# Patient Record
Sex: FEMALE | Race: WHITE | Employment: FULL TIME | URBAN - METROPOLITAN AREA
[De-identification: names, ages, dates, MRNs, and addresses within clinical notes are randomized per-mention and may not be internally consistent; named-entity substitution may affect disease eponyms.]

---

## 2017-01-20 DIAGNOSIS — I10 ESSENTIAL HYPERTENSION WITH GOAL BLOOD PRESSURE LESS THAN 140/90: Primary | ICD-10-CM

## 2017-01-20 NOTE — TELEPHONE ENCOUNTER
lisinopril (PRINIVIL,ZESTRIL) 5 MG tablet      Last Written Prescription Date: 8/3/2016  Last Fill Quantity: 90, # refills: 1  Last Office Visit with [facility] [place] or  Health prescribing provider: 8/3/2016   Next 5 appointments (look out 90 days)     Feb 06, 2017 10:00 AM   PHYSICAL with Mary Carmen Rdz MD   [facility]    [address] [address]   [phone]                   POTASSIUM   Date Value Ref Range Status   07/18/2016 4.4 3.4 - 5.3 mmol/L Final     CREATININE   Date Value Ref Range Status   07/18/2016 0.91 0.52 - 1.04 mg/dL Final     BP Readings from Last 3 Encounters:   11/04/16 128/80   08/03/16 134/86   07/18/16 136/88     amLODIPine (NORVASC) 10 MG tablet      Last Written Prescription Date: 8/3/2016  Last Fill Quantity: 90, # refills: 1    Last Office Visit with [facility] [place] or  Health prescribing provider:  8/3/2016   Future Office Visit:    Next 5 appointments (look out 90 days)     Feb 06, 2017 10:00 AM   PHYSICAL with Mary Carmen Rdz MD   [facility]    [address]  [place] [address]   [phone]                    BP Readings from Last 3 Encounters:   11/04/16 128/80   08/03/16 134/86   07/18/16 136/88

## 2017-01-24 RX ORDER — AMLODIPINE BESYLATE 10 MG/1
TABLET ORAL
Qty: 90 TABLET | Refills: 1 | Status: SHIPPED | OUTPATIENT
Start: 2017-01-24 | End: 2017-05-22

## 2017-01-24 RX ORDER — LISINOPRIL 5 MG/1
TABLET ORAL
Qty: 90 TABLET | Refills: 1 | Status: SHIPPED | OUTPATIENT
Start: 2017-01-24 | End: 2017-05-22

## 2017-01-24 NOTE — TELEPHONE ENCOUNTER
Prescription approved per [facility] [place] [facility].  Sheila Gonzalez RN  ]

## 2017-03-13 ENCOUNTER — DOCUMENTATION ONLY (OUTPATIENT)
Dept: LAB | Facility: CLINIC | Age: 62
End: 2017-03-13

## 2017-03-13 DIAGNOSIS — E78.5 HYPERLIPIDEMIA LDL GOAL <130: Primary | ICD-10-CM

## 2017-03-13 DIAGNOSIS — I10 ESSENTIAL HYPERTENSION WITH GOAL BLOOD PRESSURE LESS THAN 140/90: ICD-10-CM

## 2017-03-13 DIAGNOSIS — E53.8 VITAMIN B 12 DEFICIENCY: ICD-10-CM

## 2017-03-13 DIAGNOSIS — E03.8 OTHER SPECIFIED HYPOTHYROIDISM: ICD-10-CM

## 2017-03-13 DIAGNOSIS — R31.29 MICROHEMATURIA: ICD-10-CM

## 2017-03-16 ENCOUNTER — APPOINTMENT (OUTPATIENT)
Dept: GENERAL RADIOLOGY | Facility: CLINIC | Age: 62
End: 2017-03-16
Attending: EMERGENCY MEDICINE
Payer: COMMERCIAL

## 2017-03-16 ENCOUNTER — HOSPITAL ENCOUNTER (EMERGENCY)
Facility: CLINIC | Age: 62
Discharge: HOME | End: 2017-03-16
Attending: EMERGENCY MEDICINE | Admitting: EMERGENCY MEDICINE
Payer: COMMERCIAL

## 2017-03-16 VITALS
BODY MASS INDEX: 29.97 KG/M2 | DIASTOLIC BLOOD PRESSURE: 80 MMHG | RESPIRATION RATE: 20 BRPM | WEIGHT: 200 LBS | OXYGEN SATURATION: 96 % | SYSTOLIC BLOOD PRESSURE: 120 MMHG | HEART RATE: 95 BPM | TEMPERATURE: 98.7 F

## 2017-03-16 DIAGNOSIS — F33.1 MAJOR DEPRESSIVE DISORDER, RECURRENT EPISODE, MODERATE (H): ICD-10-CM

## 2017-03-16 DIAGNOSIS — E78.5 HYPERLIPIDEMIA LDL GOAL <130: ICD-10-CM

## 2017-03-16 DIAGNOSIS — J10.1 INFLUENZA B: ICD-10-CM

## 2017-03-16 DIAGNOSIS — G43.709 CHRONIC MIGRAINE WITHOUT AURA WITHOUT STATUS MIGRAINOSUS, NOT INTRACTABLE: ICD-10-CM

## 2017-03-16 DIAGNOSIS — J18.9 CAP (COMMUNITY ACQUIRED PNEUMONIA): ICD-10-CM

## 2017-03-16 LAB
FLUAV+FLUBV AG SPEC QL: ABNORMAL
FLUAV+FLUBV AG SPEC QL: NEGATIVE
SPECIMEN SOURCE: ABNORMAL

## 2017-03-16 PROCEDURE — 99284 EMERGENCY DEPT VISIT MOD MDM: CPT | Performed by: EMERGENCY MEDICINE

## 2017-03-16 PROCEDURE — 25000125 ZZHC RX 250: Performed by: EMERGENCY MEDICINE

## 2017-03-16 PROCEDURE — 87804 INFLUENZA ASSAY W/OPTIC: CPT | Performed by: EMERGENCY MEDICINE

## 2017-03-16 PROCEDURE — 94640 AIRWAY INHALATION TREATMENT: CPT

## 2017-03-16 PROCEDURE — 99284 EMERGENCY DEPT VISIT MOD MDM: CPT | Mod: 25

## 2017-03-16 PROCEDURE — 71020 XR CHEST 2 VW: CPT

## 2017-03-16 RX ORDER — AZITHROMYCIN 250 MG/1
TABLET, FILM COATED ORAL
Qty: 6 TABLET | Refills: 0 | Status: SHIPPED | OUTPATIENT
Start: 2017-03-16 | End: 2017-03-21

## 2017-03-16 RX ORDER — ALBUTEROL SULFATE 90 UG/1
2 AEROSOL, METERED RESPIRATORY (INHALATION) EVERY 4 HOURS PRN
Qty: 1 INHALER | Refills: 0 | Status: SHIPPED | OUTPATIENT
Start: 2017-03-16 | End: 2017-03-26

## 2017-03-16 RX ORDER — OSELTAMIVIR PHOSPHATE 75 MG/1
75 CAPSULE ORAL 2 TIMES DAILY
Qty: 10 CAPSULE | Refills: 0 | Status: SHIPPED | OUTPATIENT
Start: 2017-03-16 | End: 2017-03-21

## 2017-03-16 RX ORDER — IPRATROPIUM BROMIDE AND ALBUTEROL SULFATE 2.5; .5 MG/3ML; MG/3ML
3 SOLUTION RESPIRATORY (INHALATION) ONCE
Status: COMPLETED | OUTPATIENT
Start: 2017-03-16 | End: 2017-03-16

## 2017-03-16 RX ADMIN — IPRATROPIUM BROMIDE AND ALBUTEROL SULFATE 3 ML: .5; 3 SOLUTION RESPIRATORY (INHALATION) at 14:44

## 2017-03-16 NOTE — ED PROVIDER NOTES
History     Chief Complaint   Patient presents with     Cough     HPI  Mary Grace Tompkins is a 62 year old female with a history of HTN who presents to the ED for a cough over the last 6 days. Patient states she has been coughing so much that her ribs and chest are sore. She then developed a severe left sinus headache that has radiated to her forehead. Her symptoms did improve somewhat yesterday, but after waking up from a nap yesterday afternoon they were worse again. Now she notes chills, runny nose, and sore throat. No fevers. No vomiting. Other symptoms include fatigue and a couple episodes of diarrhea. She admits she is not taking fluids well and has only urinated twice today. Patient's coworker is ill with influenza B. Patient did not receive an influenza vaccine this year.     Patient Active Problem List   Diagnosis     Obstructive sleep apnea     Migraines     Hypothyroid     OA (osteoarthritis)     GERD (gastroesophageal reflux disease)     Vitamin B 12 deficiency     Celiac disease     Diagnostic skin and sensitization tests(aka ALLERGENS)     VMR (vasomotor rhinitis)     Chronic urticaria     Advanced directives, counseling/discussion     Hot flashes     Nonspecific abnormal electrocardiogram (ECG) (EKG)     Major depressive disorder, recurrent episode, moderate (H)     Hyperlipidemia LDL goal <130     Microhematuria     Essential hypertension with goal blood pressure less than 140/90     Current Outpatient Prescriptions   Medication Sig Dispense Refill     azithromycin (ZITHROMAX Z-MINERVA) 250 MG tablet Two tablets on the first day, then one tablet daily for the next 4 days 6 tablet 0     oseltamivir (TAMIFLU) 75 MG capsule Take 1 capsule (75 mg) by mouth 2 times daily for 5 days 10 capsule 0     albuterol (PROAIR HFA/PROVENTIL HFA/VENTOLIN HFA) 108 (90 BASE) MCG/ACT Inhaler Inhale 2 puffs into the lungs every 4 hours as needed for shortness of breath / dyspnea or wheezing 1 Inhaler 0     amLODIPine (NORVASC)  10 MG tablet TAKE 1 TABLET DAILY 90 tablet 1     lisinopril (PRINIVIL/ZESTRIL) 5 MG tablet TAKE 1 TABLET DAILY 90 tablet 1     LANsoprazole (PREVACID) 30 MG CR capsule Take 1 capsule (30 mg) by mouth daily 90 capsule 3     levothyroxine (SYNTHROID/LEVOTHROID) 137 MCG tablet Take 1 tablet (137 mcg) by mouth daily ELENO. 90 tablet 2     order for DME Equipment being ordered: CPAP  AIRSENSE 10  WISP LARGE  5-15 CM H2O  SN# [id number]  DN# [id number]       topiramate (TOPAMAX) 100 MG tablet Take 1 tablet (100 mg) by mouth 2 times daily 180 tablet 1     traZODone (DESYREL) 50 MG tablet Take 1 tablet (50 mg) by mouth At Bedtime 90 tablet 3     venlafaxine (EFFEXOR-XR) 75 MG 24 hr capsule Take 3 capsules (225 mg) by mouth daily 270 capsule 1     ARIPiprazole (ABILIFY) 2 MG tablet Take 1/2 pill at bedtime 45 tablet 1     zinc 50 MG TABS Take 1 tablet by mouth daily.       Cholecalciferol (VITAMIN D3) 1000 UNIT TABS Take 1 tablet by mouth daily.       Multiple Vitamin (MULTIVITAMIN OR) Take  by mouth.       aspirin 81 MG tablet Take 1 tablet by mouth daily.       lovastatin (MEVACOR) 10 MG tablet Take 1 tablet (10 mg) by mouth At Bedtime 90 tablet 0     order for DME Equipment being ordered: BP monitor 1 each 0     Cyanocobalamin (VITAMIN B-12 SL) Place 1 tablet under the tongue daily.       Allergies   Allergen Reactions     Imitrex [Sumatriptan] Other (See Comments)     Worsening headache     Penicillins Hives     hives     Dairy [Milk Products]      Migraines and upset stomach     Gluten        I have reviewed the Medications, Allergies, Past Medical and Surgical History, and Social History in the Epic system.    Review of Systems   All other systems reviewed and are negative.      Physical Exam   BP: 119/77  Heart Rate: 95  Temp: 98.7  F (37.1  C)  Weight: 90.7 kg (200 lb)  SpO2: 96 %  Physical Exam   Mildly ill-appearing nontoxic no respiratory distress  Alert and oriented ×3  Oropharynx moist without lesions or  erythema  Breath sounds slightly diminished scant expiratory wheezes no rales  Heart regular no murmur  Abdomen soft nontender  Skin pink warm and dry    ED Course     ED Course     Procedures             Critical Care time:  none               Labs Ordered and Resulted from Time of ED Arrival Up to the Time of Departure from the ED   INFLUENZA A/B ANTIGEN - Abnormal; Notable for the following:        Result Value    Influenza B   (*)     Value: Positive   Test results must be correlated with clinical data. If necessary, results   should be confirmed by a molecular assay or viral culture.      All other components within normal limits       Results for orders placed or performed during the hospital encounter of 03/16/17 (from the past 24 hour(s))   Influenza A/B antigen   Result Value Ref Range    Influenza A/B Agn Specimen Nasopharyngeal     Influenza A Negative NEG    Influenza B (A) NEG     Positive   Test results must be correlated with clinical data. If necessary, results   should be confirmed by a molecular assay or viral culture.     Chest XR,  PA & LAT    Narrative    XR CHEST 2 VW 3/16/2017 3:54 PM    COMPARISON: None.    HISTORY: Cough      Impression    IMPRESSION: Mild left basilar atelectasis/consolidation. Right lung is  clear. No pleural effusion or pneumothorax. Cardiac silhouette within  normal limits.    LYNN LALA       Medications   ipratropium - albuterol 0.5 mg/2.5 mg/3 mL (DUONEB) neb solution 3 mL (3 mLs Nebulization Given 3/16/17 1444)       2:21 PM Patient assessed.      MDM: 62-year-old female presents 6 days respiratory symptoms, worsening over the last 24 hours with fever.  Positive for influenza B.  Question left lower lobe infiltrate.  Elect to treat with Z-Diego, Tamiflu, albuterol MDI given loosened cough and resolution of wheezes after DuoNeb.  Rest, plenty fluids, return criteria reviewed.       I have reviewed the nursing notes.    I have reviewed the findings, diagnosis, plan and  need for follow up with the patient.    Discharge Medication List as of 3/16/2017  4:29 PM      START taking these medications    Details   azithromycin (ZITHROMAX Z-MINERVA) 250 MG tablet Two tablets on the first day, then one tablet daily for the next 4 days, Disp-6 tablet, R-0, E-Prescribe      oseltamivir (TAMIFLU) 75 MG capsule Take 1 capsule (75 mg) by mouth 2 times daily for 5 days, Disp-10 capsule, R-0, E-Prescribe      albuterol (PROAIR HFA/PROVENTIL HFA/VENTOLIN HFA) 108 (90 BASE) MCG/ACT Inhaler Inhale 2 puffs into the lungs every 4 hours as needed for shortness of breath / dyspnea or wheezing, Disp-1 Inhaler, R-0, E-Prescribe             Final diagnoses:   Influenza B   CAP (community acquired pneumonia)     This document serves as a record of the services and decisions personally performed and made by Rui Santamaria MD. It was created on HIS/HER behalf by Lizeth Correa, a trained medical scribe. The creation of this document is based the provider's statements to the medical scribe.  Lizeth Correa 2:21 PM 3/16/2017    Provider:   The information in this document, created by the medical scribe for me, accurately reflects the services I personally performed and the decisions made by me. I have reviewed and approved this document for accuracy prior to leaving the patient care area.  Rui Santamaria MD 2:21 PM 3/16/2017    3/16/2017   [facility] EMERGENCY DEPARTMENT     Rui Santamaria MD  03/16/17 8903

## 2017-03-16 NOTE — ED AVS SNAPSHOT
[facility] Emergency Department    [address] [place] [address]    Phone:  [phone]    Fax:  [phone]                                       Mary Grace Tompkins   MRN: [id number]    Department:  [facility] Emergency Department   Date of Visit:  3/16/2017           Patient Information     Date Of Birth          1955        Your diagnoses for this visit were:     Influenza B     CAP (community acquired pneumonia)        You were seen by Rui Santamaria MD.      Follow-up Information     Follow up with Mary Carmen Rdz MD.    Specialty:  Family Practice    Contact information:    [facility]  [address]  [place] [address]  [phone]          Discharge Instructions         Pneumonia (Adult)  Pneumonia is an infection deep within the lungs. It is in the small air sacs (alveoli). Pneumonia may be caused by a virus or bacteria. Pneumonia caused by bacteria is usually treated with an antibiotic. Severe cases may need to be treated in the hospital. Milder cases can be treated at home. Symptoms usually start to get better during the first 2 days of treatment.    Home care  Follow these guidelines when caring for yourself at home:    Rest at home for the first 2 to 3 days, or until you feel stronger. Don t let yourself get overly tired when you go back to your activities.    Stay away from cigarette smoke - yours or other people s.    You may use acetaminophen or ibuprofen to control fever or pain, unless another medicine was prescribed. If you have chronic liver or kidney disease, talk with your health care provider before using these medicines. Also talk with your provider if you ve had a stomach ulcer or GI bleeding. Don t give aspirin to anyone younger than 18 years of age who is ill with a fever. It may cause severe liver damage.    Your appetite may be poor, so a light diet is fine.    Drink 6 to 8 glasses of fluids every day to make sure you are getting enough  fluids. Beverages can include water, sport drinks, sodas without caffeine, juices, tea, or soup. Fluids will help loosen secretions in the lung. This will make it easier for you to cough up the phlegm (sputum). If you also have heart or kidney disease, check with your health care provider before you drink extra fluids.    Take antibiotic medicine prescribed until it is all gone, even if you are feeling better after a few days.  Follow-up care  Follow up with your health care provider in the next 2 to 3 days, or as advised. This is to be sure the medicine is helping you get better.  If you are 65 or older, you should get a pneumococcal vaccine and a yearly flu (influenza) shot. You should also get these vaccines if you have chronic lung disease like asthma, emphysema, or COPD. Ask your provider about this.  When to seek medical advice  Call your health care provider right away if any of these occur:    You don t get better within the first 48 hours of treatment    Shortness of breath gets worse    Rapid breathing (more than 25 breaths per minute)    Coughing up blood    Chest pain gets worse with breathing    Fever of 102 F (38 C) or higher that doesn t get better with fever medicine    Weakness, dizziness, or fainting that gets worse    Thirst or dry mouth that gets worse    Sinus pain, headache, or a stiff neck    Chest pain not caused by coughing    6033-0594 [employer]. [address] [place] [address], [place] [id number]. All rights reserved. This information is not intended as a substitute for professional medical care. Always follow your healthcare professional's instructions.          Influenza  Influenza ( the flu ) is an infection that affects your respiratory tract (the mouth, nose, and lungs, and the passages between them). Unlike a cold, the flu can make you very ill. And it can lead to pneumonia, a serious lung infection. For some people, especially older adults, young children, and people with  certain chronic conditions, the flu can have serious complications and even be fatal.  What Are the Risk Factors for the Flu?     Viruses that cause influenza spread through the air in droplets when someone who has the flu coughs, sneezes, laughs, or talks.   Anyone can get the flu. But you re more likely to become infected if you:    Have a weakened immune system.    Work in a health care setting where you may be exposed to flu germs.    Live or work with someone who has the flu.    Haven t received an annual flu shot.  How Does the Flu Spread?  The flu is caused by viruses. The viruses spread through the air in droplets when someone who has the flu coughs, sneezes, laughs, or talks. You can become infected when you inhale these viruses directly. You can also become infected when you touch a surface on which the droplets have landed and then transfer the germs to your eyes, nose, or mouth. Touching used tissues, or sharing utensils, drinking glasses, or a toothbrush with an infected person can expose you to flu viruses, too.  What Are the Symptoms of the Flu?  Flu symptoms tend to come on quickly and may last a few days to a few weeks. They include:    Fever usually higher than 101 F  (38.3 C) and chills    Sore throat and headache    Dry cough    Runny nose    Tiredness and weakness    Muscle aches  Factors That Can Make Flu Worse  For some people, the flu can be very serious. The risk of complications is greater for:    Children under age 5.    Adults 65 years of age and older.    People with a chronic illness, such as diabetes or heart, kidney, or lung disease.    People who live in a nursing home or long-term care facility.   How Is the Flu Treated?  Influenza usually improves after 7 days or so. In some cases, your health care provider may prescribe an antiviral medication. This may help you get well sooner. For the medication to help, you need to take it as soon as possible (ideally within 48 hours) after  your symptoms start. If you develop pneumonia or other serious illness, hospital care may be needed.  Easing Flu Symptoms    Drink lots of fluids such as water, juice, and warm soup. A good rule is to drink enough so that you urinate your normal amount.    Get plenty of rest.    Ask your health care provider what to take for fever and pain.    Call your provider if your fever rises over 101 F (38.3 C) or you become dizzy, lightheaded, or short of breath.  Taking Steps to Protect Others    Wash your hands often, especially after coughing or sneezing. Or, clean your hands with an alcohol-based hand  containing at least 60 percent alcohol.    Cough or sneeze into a tissue. Then throw the tissue away and wash your hands. If you don t have a tissue, cough and sneeze into the crook of your elbow.    Stay home until at least 24 hours after you no longer have a fever or chills. Be sure the fever isn t being hidden by fever-reducing medication.    Don t share food, utensils, drinking glasses, or a toothbrush with others.    Ask your health care provider if others in your household should receive antiviral medication to help them avoid infection.  How Can the Flu Be Prevented?    One of the best ways to avoid the flu is to get a flu vaccination each year. Viruses that cause the flu change from year to year. For that reason, doctors recommend getting the flu vaccine each year, as soon as it's available in your area. The vaccine may be given as a shot or as a nasal spray. Your health care provider can tell you which vaccine is right for you.    Wash your hands often. Frequent handwashing is a proven way to help prevent infection.    Carry an alcohol-based hand gel containing at least 60 percent alcohol. Use it when you don t have access to soap and water. Then wash your hands as soon as you can.    Avoid touching your eyes, nose, and mouth.    At home and work, clean phones, computer keyboards, and toys often with  disinfectant wipes.    If possible, avoid close contact with others who have the flu or symptoms of the flu.  Handwashing Tips  Handwashing is one of the best ways to prevent many common infections. If you re caring for or visiting someone with the flu, wash your hands each time you enter and leave the room. Follow these steps:    Use warm water and plenty of soap. Rub your hands together well.    Clean the whole hand, under your nails, between your fingers, and up the wrists.    Wash for at least 15 seconds.    Rinse, letting the water run down your fingers, not up your wrists.    Dry your hands well. Use a paper towel to turn off the faucet and open the door.  Using Alcohol-Based Hand   Alcohol-based hand  are also a good choice. Use them when you don t have access to soap and water. Follow these steps:    Squeeze about a tablespoon of gel into the palm of one hand.    Rub your hands together briskly, cleaning the backs of your hands, the palms, between your fingers, and up the wrists.    Rub until the gel is gone and your hands are completely dry.  Preventing Influenza in Healthcare Settings  The flu is a special concern for people in hospitals and long-term care facilities. To help prevent the spread of flu, many hospitals and nursing homes take these steps:    Health care providers wash their hands or use an alcohol-based hand  before and after treating each patient.    People with the flu have private rooms and bathrooms or share a room with someone with the same infection.    High-risk patients who don t have the flu are encouraged to get the flu and pneumonia vaccines.    All health care workers are encouraged or required to get flu shots.        6243-5400 [employer]. [address] [place] [address], [place] [address]. All rights reserved. This information is not intended as a substitute for professional medical care. Always follow your healthcare professional's  instructions.          Future Appointments        Provider Department Dept Phone Center    4/21/2017 7:30 AM [facility] [phone] [facility]    4/28/2017 12:30 PM Mary Carmen Rdz MD [facility] [phone] [facility]      24 Hour Appointment Hotline       To make an appointment at any [facility], call [phone] ([phone]). If you don't have a family doctor or clinic, we will help you find one. [facility] are conveniently located to serve the needs of you and your family.             Review of your medicines      START taking        Dose / Directions Last dose taken    albuterol 108 (90 BASE) MCG/ACT Inhaler   Commonly known as:  PROAIR HFA/PROVENTIL HFA/VENTOLIN HFA   Dose:  2 puff   Quantity:  1 Inhaler        Inhale 2 puffs into the lungs every 4 hours as needed for shortness of breath / dyspnea or wheezing   Refills:  0        azithromycin 250 MG tablet   Commonly known as:  ZITHROMAX Z-MINERVA   Quantity:  6 tablet        Two tablets on the first day, then one tablet daily for the next 4 days   Refills:  0        oseltamivir 75 MG capsule   Commonly known as:  TAMIFLU   Dose:  75 mg   Quantity:  10 capsule        Take 1 capsule (75 mg) by mouth 2 times daily for 5 days   Refills:  0          Our records show that you are taking the medicines listed below. If these are incorrect, please call your family doctor or clinic.        Dose / Directions Last dose taken    amLODIPine 10 MG tablet   Commonly known as:  NORVASC   Quantity:  90 tablet        TAKE 1 TABLET DAILY   Refills:  1        ARIPiprazole 2 MG tablet   Commonly known as:  ABILIFY   Quantity:  45 tablet        Take 1/2 pill at bedtime   Refills:  1        aspirin 81 MG tablet   Dose:  1 tablet        Take 1 tablet by mouth daily.   Refills:  0        cholecalciferol 1000 UNIT tablet   Commonly known as:  vitamin D   Dose:  1 tablet        Take 1 tablet by mouth daily.   Refills:   0        LANsoprazole 30 MG CR capsule   Commonly known as:  PREVACID   Dose:  30 mg   Quantity:  90 capsule        Take 1 capsule (30 mg) by mouth daily   Refills:  3        levothyroxine 137 MCG tablet   Commonly known as:  SYNTHROID/LEVOTHROID   Dose:  137 mcg   Quantity:  90 tablet        Take 1 tablet (137 mcg) by mouth daily ELENO.   Refills:  2        lisinopril 5 MG tablet   Commonly known as:  PRINIVIL/ZESTRIL   Quantity:  90 tablet        TAKE 1 TABLET DAILY   Refills:  1        lovastatin 10 MG tablet   Commonly known as:  MEVACOR   Dose:  10 mg   Quantity:  90 tablet        Take 1 tablet (10 mg) by mouth At Bedtime   Refills:  0        MULTIVITAMIN PO        Take  by mouth.   Refills:  0        order for DME        Equipment being ordered: CPAP AIRSENSE 10 WISP LARGE 5-15 CM H2O SN# [id number]  DN# [id number]   Refills:  0        order for DME   Quantity:  1 each        Equipment being ordered: BP monitor   Refills:  0        topiramate 100 MG tablet   Commonly known as:  TOPAMAX   Dose:  100 mg   Quantity:  180 tablet        Take 1 tablet (100 mg) by mouth 2 times daily   Refills:  1        traZODone 50 MG tablet   Commonly known as:  DESYREL   Dose:  50 mg   Quantity:  90 tablet        Take 1 tablet (50 mg) by mouth At Bedtime   Refills:  3        venlafaxine 75 MG 24 hr capsule   Commonly known as:  EFFEXOR-XR   Dose:  225 mg   Quantity:  270 capsule        Take 3 capsules (225 mg) by mouth daily   Refills:  1        VITAMIN B-12 SL   Dose:  1 tablet        Place 1 tablet under the tongue daily.   Refills:  0        zinc 50 MG Tabs   Dose:  1 tablet        Take 1 tablet by mouth daily.   Refills:  0                Prescriptions were sent or printed at these locations (3 Prescriptions)                   [id number] - [facility] [address] AT [facility] OF [address] [place] [address]   [address] [facility] [address]    Telephone:  [phone]   Fax:  [phone]   Hours:                   E-Prescribed (3 of 3)         azithromycin (ZITHROMAX Z-MINERVA) 250 MG tablet               oseltamivir (TAMIFLU) 75 MG capsule               albuterol (PROAIR HFA/PROVENTIL HFA/VENTOLIN HFA) 108 (90 BASE) MCG/ACT Inhaler                Procedures and tests performed during your visit     Chest XR,  PA & LAT    Influenza A/B antigen      Orders Needing Specimen Collection     None      Pending Results     No orders found from 3/14/2017 to 3/17/2017.            Pending Culture Results     No orders found from 3/14/2017 to 3/17/2017.             Test Results from your hospital stay     3/16/2017  4:00 PM - Interface, Radiant Ib      Narrative     XR CHEST 2 VW 3/16/2017 3:54 PM    COMPARISON: None.    HISTORY: Cough        Impression     IMPRESSION: Mild left basilar atelectasis/consolidation. Right lung is  clear. No pleural effusion or pneumothorax. Cardiac silhouette within  normal limits.    LYNN LALA         3/16/2017  3:31 PM - Interface, Flexilab Results      Component Results     Component Value Ref Range & Units Status    Influenza A/B Agn Specimen Nasopharyngeal  Final    Influenza A Negative NEG Final    Influenza B  NEG Final    Positive   Test results must be correlated with clinical data. If necessary, results   should be confirmed by a molecular assay or viral culture.   (A)                Thank you for choosing [facility]       Thank you for choosing [facility] for your care. Our goal is always to provide you with excellent care. Hearing back from our patients is one way we can continue to improve our services. Please take a few minutes to complete the written survey that you may receive in the mail after you visit with us. Thank you!        [employer] Information     [employer] gives you secure access to your electronic health record. If you see a primary care provider, you can also send messages to your care team and make appointments. If you have questions, please call your primary care clinic.  If you  do not have a primary care provider, please call [phone] and they will assist you.        Care EveryWhere ID     This is your Care EveryWhere ID. This could be used by other organizations to access your [facility] medical records  [id number]        After Visit Summary       This is your record. Keep this with you and show to your community pharmacist(s) and doctor(s) at your next visit.

## 2017-03-16 NOTE — ED AVS SNAPSHOT
[facility] Emergency Department    [address] [address]    Phone:  [phone]    Fax:  [phone]                                       Mary Grace Tompkins   MRN: [id number]    Department:  [facility] Emergency Department   Date of Visit:  3/16/2017           After Visit Summary Signature Page     I have received my discharge instructions, and my questions have been answered. I have discussed any challenges I see with this plan with the nurse or doctor.    ..........................................................................................................................................  Patient/Patient Representative Signature      ..........................................................................................................................................  Patient Representative Print Name and Relationship to Patient    ..................................................               ................................................  Date                                            Time    ..........................................................................................................................................  Reviewed by Signature/Title    ...................................................              ..............................................  Date                                                            Time

## 2017-03-16 NOTE — DISCHARGE INSTRUCTIONS
Pneumonia (Adult)  Pneumonia is an infection deep within the lungs. It is in the small air sacs (alveoli). Pneumonia may be caused by a virus or bacteria. Pneumonia caused by bacteria is usually treated with an antibiotic. Severe cases may need to be treated in the hospital. Milder cases can be treated at home. Symptoms usually start to get better during the first 2 days of treatment.    Home care  Follow these guidelines when caring for yourself at home:    Rest at home for the first 2 to 3 days, or until you feel stronger. Don t let yourself get overly tired when you go back to your activities.    Stay away from cigarette smoke - yours or other people s.    You may use acetaminophen or ibuprofen to control fever or pain, unless another medicine was prescribed. If you have chronic liver or kidney disease, talk with your health care provider before using these medicines. Also talk with your provider if you ve had a stomach ulcer or GI bleeding. Don t give aspirin to anyone younger than 18 years of age who is ill with a fever. It may cause severe liver damage.    Your appetite may be poor, so a light diet is fine.    Drink 6 to 8 glasses of fluids every day to make sure you are getting enough fluids. Beverages can include water, sport drinks, sodas without caffeine, juices, tea, or soup. Fluids will help loosen secretions in the lung. This will make it easier for you to cough up the phlegm (sputum). If you also have heart or kidney disease, check with your health care provider before you drink extra fluids.    Take antibiotic medicine prescribed until it is all gone, even if you are feeling better after a few days.  Follow-up care  Follow up with your health care provider in the next 2 to 3 days, or as advised. This is to be sure the medicine is helping you get better.  If you are 65 or older, you should get a pneumococcal vaccine and a yearly flu (influenza) shot. You should also get these vaccines if you have  chronic lung disease like asthma, emphysema, or COPD. Ask your provider about this.  When to seek medical advice  Call your health care provider right away if any of these occur:    You don t get better within the first 48 hours of treatment    Shortness of breath gets worse    Rapid breathing (more than 25 breaths per minute)    Coughing up blood    Chest pain gets worse with breathing    Fever of 102 F (38 C) or higher that doesn t get better with fever medicine    Weakness, dizziness, or fainting that gets worse    Thirst or dry mouth that gets worse    Sinus pain, headache, or a stiff neck    Chest pain not caused by coughing    1224-7986 [employer]. [address] [place] [address]. All rights reserved. This information is not intended as a substitute for professional medical care. Always follow your healthcare professional's instructions.          Influenza  Influenza ( the flu ) is an infection that affects your respiratory tract (the mouth, nose, and lungs, and the passages between them). Unlike a cold, the flu can make you very ill. And it can lead to pneumonia, a serious lung infection. For some people, especially older adults, young children, and people with certain chronic conditions, the flu can have serious complications and even be fatal.  What Are the Risk Factors for the Flu?     Viruses that cause influenza spread through the air in droplets when someone who has the flu coughs, sneezes, laughs, or talks.   Anyone can get the flu. But you re more likely to become infected if you:    Have a weakened immune system.    Work in a health care setting where you may be exposed to flu germs.    Live or work with someone who has the flu.    Haven t received an annual flu shot.  How Does the Flu Spread?  The flu is caused by viruses. The viruses spread through the air in droplets when someone who has the flu coughs, sneezes, laughs, or talks. You can become infected when you inhale  these viruses directly. You can also become infected when you touch a surface on which the droplets have landed and then transfer the germs to your eyes, nose, or mouth. Touching used tissues, or sharing utensils, drinking glasses, or a toothbrush with an infected person can expose you to flu viruses, too.  What Are the Symptoms of the Flu?  Flu symptoms tend to come on quickly and may last a few days to a few weeks. They include:    Fever usually higher than 101 F  (38.3 C) and chills    Sore throat and headache    Dry cough    Runny nose    Tiredness and weakness    Muscle aches  Factors That Can Make Flu Worse  For some people, the flu can be very serious. The risk of complications is greater for:    Children under age 5.    Adults 65 years of age and older.    People with a chronic illness, such as diabetes or heart, kidney, or lung disease.    People who live in a nursing home or long-term care facility.   How Is the Flu Treated?  Influenza usually improves after 7 days or so. In some cases, your health care provider may prescribe an antiviral medication. This may help you get well sooner. For the medication to help, you need to take it as soon as possible (ideally within 48 hours) after your symptoms start. If you develop pneumonia or other serious illness, hospital care may be needed.  Easing Flu Symptoms    Drink lots of fluids such as water, juice, and warm soup. A good rule is to drink enough so that you urinate your normal amount.    Get plenty of rest.    Ask your health care provider what to take for fever and pain.    Call your provider if your fever rises over 101 F (38.3 C) or you become dizzy, lightheaded, or short of breath.  Taking Steps to Protect Others    Wash your hands often, especially after coughing or sneezing. Or, clean your hands with an alcohol-based hand  containing at least 60 percent alcohol.    Cough or sneeze into a tissue. Then throw the tissue away and wash your hands. If  you don t have a tissue, cough and sneeze into the crook of your elbow.    Stay home until at least 24 hours after you no longer have a fever or chills. Be sure the fever isn t being hidden by fever-reducing medication.    Don t share food, utensils, drinking glasses, or a toothbrush with others.    Ask your health care provider if others in your household should receive antiviral medication to help them avoid infection.  How Can the Flu Be Prevented?    One of the best ways to avoid the flu is to get a flu vaccination each year. Viruses that cause the flu change from year to year. For that reason, doctors recommend getting the flu vaccine each year, as soon as it's available in your area. The vaccine may be given as a shot or as a nasal spray. Your health care provider can tell you which vaccine is right for you.    Wash your hands often. Frequent handwashing is a proven way to help prevent infection.    Carry an alcohol-based hand gel containing at least 60 percent alcohol. Use it when you don t have access to soap and water. Then wash your hands as soon as you can.    Avoid touching your eyes, nose, and mouth.    At home and work, clean phones, computer keyboards, and toys often with disinfectant wipes.    If possible, avoid close contact with others who have the flu or symptoms of the flu.  Handwashing Tips  Handwashing is one of the best ways to prevent many common infections. If you re caring for or visiting someone with the flu, wash your hands each time you enter and leave the room. Follow these steps:    Use warm water and plenty of soap. Rub your hands together well.    Clean the whole hand, under your nails, between your fingers, and up the wrists.    Wash for at least 15 seconds.    Rinse, letting the water run down your fingers, not up your wrists.    Dry your hands well. Use a paper towel to turn off the faucet and open the door.  Using Alcohol-Based Hand   Alcohol-based hand  are also  a good choice. Use them when you don t have access to soap and water. Follow these steps:    Squeeze about a tablespoon of gel into the palm of one hand.    Rub your hands together briskly, cleaning the backs of your hands, the palms, between your fingers, and up the wrists.    Rub until the gel is gone and your hands are completely dry.  Preventing Influenza in Healthcare Settings  The flu is a special concern for people in hospitals and long-term care facilities. To help prevent the spread of flu, many hospitals and nursing homes take these steps:    Health care providers wash their hands or use an alcohol-based hand  before and after treating each patient.    People with the flu have private rooms and bathrooms or share a room with someone with the same infection.    High-risk patients who don t have the flu are encouraged to get the flu and pneumonia vaccines.    All health care workers are encouraged or required to get flu shots.        7590-4911 [employer]. [address] [place] [address], [place] [address]. All rights reserved. This information is not intended as a substitute for professional medical care. Always follow your healthcare professional's instructions.

## 2017-03-16 NOTE — TELEPHONE ENCOUNTER
topiramate (TOPAMAX) 100 MG tablet      Last Written Prescription Date: 8/3/16  Last Fill Quantity: 180, # refills: 1  Last Office Visit with [facility], [facility] or  Health prescribing provider: 8/3/16  Next 5 appointments (look out 90 days)     Apr 28, 2017 12:30 PM CDT   PHYSICAL with Mary Carmen Rdz MD   [facility] ([facility])    [address]  [place] [address]   [phone]                   BP Readings from Last 3 Encounters:   11/04/16 128/80   08/03/16 134/86   07/18/16 136/88     venlafaxine (EFFEXOR-XR) 75 MG 24 hr capsule    Last Written Prescription Date: 8/3/16  Last Fill Quantity: 270, # refills: 1  Last Office Visit with [facility], [facility] or  Health prescribing provider: 8/3/16   Next 5 appointments (look out 90 days)     Apr 28, 2017 12:30 PM CDT   PHYSICAL with Mary Carmen Rdz MD   [facility] ([facility])    [address] [place] [address]  [place] [address]   [phone]                   BP Readings from Last 3 Encounters:   11/04/16 128/80   08/03/16 134/86   07/18/16 136/88     Pulse: (for Fetzima)  Creatinine   Date Value Ref Range Status   07/18/2016 0.91 0.52 - 1.04 mg/dL Final   ]    Last PHQ-9 score on record=   PHQ-9 SCORE 8/3/2016   Total Score 10

## 2017-03-16 NOTE — PROGRESS NOTES
Patient was instructed on the use of MDI. Patient was able to demonstrate understanding and performed independently.

## 2017-03-17 RX ORDER — TOPIRAMATE 100 MG/1
TABLET, FILM COATED ORAL
Qty: 180 TABLET | Refills: 0 | Status: SHIPPED | OUTPATIENT
Start: 2017-03-17 | End: 2017-06-23

## 2017-03-17 NOTE — TELEPHONE ENCOUNTER
Patient does not use her mychart. Spoke with patient on the phone she is not feeling well and is hard to understand due to a hoarse voice. Patient started questionnaire but was disconnect after question #4.   Patient states she also needs a refill on abilify because she has been out of this medication for a while. Please advise.  Kelle Weldon, CMA

## 2017-03-17 NOTE — TELEPHONE ENCOUNTER
Topamax Prescription approved per [facility] [place] Refill Protocol.    Effexor - needs PHQ-9.    Dania Joseph RN - BC

## 2017-03-17 NOTE — TELEPHONE ENCOUNTER
Voice mail left for patient to call RN hotline at [phone].  Patient needs phq-9 finished   Donna Garza RN

## 2017-03-17 NOTE — TELEPHONE ENCOUNTER
MA - Patient is overdue for PHQ 9. Please complete and route back to RN for refill.    Dania Joseph RN - BC

## 2017-03-22 RX ORDER — VENLAFAXINE HYDROCHLORIDE 75 MG/1
CAPSULE, EXTENDED RELEASE ORAL
Qty: 270 CAPSULE | Refills: 0 | Status: SHIPPED | OUTPATIENT
Start: 2017-03-22 | End: 2017-06-23

## 2017-03-22 RX ORDER — LOVASTATIN 10 MG
10 TABLET ORAL AT BEDTIME
Qty: 90 TABLET | Refills: 0 | Status: SHIPPED | OUTPATIENT
Start: 2017-03-22 | End: 2017-06-23

## 2017-03-22 RX ORDER — ARIPIPRAZOLE 2 MG/1
TABLET ORAL
Qty: 45 TABLET | Refills: 0 | Status: SHIPPED | OUTPATIENT
Start: 2017-03-22 | End: 2017-06-23

## 2017-03-22 NOTE — TELEPHONE ENCOUNTER
PHQ-9 SCORE 8/3/2016 8/3/2016 3/17/2017   Total Score - - -   Total Score 10 10 5     PHQ9 completed with score above goal.  Patient declined med adjustment stating that her current meds are effective.  Patient stated that she had been ill and was seen at [facility] ED for Respiratory issues.    Advised patient to schedule a f/u   She agreed.   Appointment made for 3/27/17    Refills sent.    Colin Whitley RN

## 2017-03-23 ASSESSMENT — PATIENT HEALTH QUESTIONNAIRE - PHQ9: SUM OF ALL RESPONSES TO PHQ QUESTIONS 1-9: 5

## 2017-03-23 NOTE — PROGRESS NOTES
"  SUBJECTIVE:                                                    Mary Grace Tompkins is a 62 year old female who presents to clinic today for the following health issues:    Depression Followup    Status since last visit: Stable, or maybe a little worse     See PHQ-9 for current symptoms.  Other associated symptoms: None    Complicating factors:   Significant life event:  Yes-  Started a new job, more stressful   Current substance abuse:  None  Anxiety or Panic symptoms:  No    PHQ-9  English PHQ-9   Any Language        PHQ-9 SCORE 3/27/2017   Total Score 9          Amount of exercise or physical activity: 5 days/week for an average of  15-30 minutes    Problems taking medications regularly: No    Medication side effects: none    Diet: regular (no restrictions)    ED/UC Followup:    Facility:  [facility]  Date of visit: March 16, 2017  Reason for visit: Influenza and pneumonia  Current Status: improving, but cough and sinus drainage are not improving.     \"Chest X-ray results from ED visit:  Chest XR, PA & LAT     Narrative     XR CHEST 2 VW 3/16/2017 3:54 PM     COMPARISON: None.     HISTORY: Cough     Impression     IMPRESSION: Mild left basilar atelectasis/consolidation. Right lung is  clear. No pleural effusion or pneumothorax. Cardiac silhouette within  normal limits.     LYNN LALA      Plan:  She was treated with z-pack-, Tamiflu, albuterol, and DuoNeb.\"     Today:  Reports she finished her courses of antibiotics. Felt she was getting 80% better last week, but now feels stuck and feels some symptoms returned. Sill coughing and experiencing some nasal drip. Able to breathe better, still feeling fatigue. Does feel some facial tenderness. Had some diarrhea throughout her sickness, but that has resolved. No ear pain/pressure. Migraines have improved with the Topamax.  Recently found a job after 15 months of unemployement, reports that she is using her PTO time due to her recent illness which is causing " stressors on her. Reports that her mood has not been good, does not think it is related to her illness, but not really sure. Does not see a therapist. On effexor and abilify (1 mg)      Problem list and histories reviewed & adjusted, as indicated.  Additional history: as documented    Patient Active Problem List   Diagnosis     Obstructive sleep apnea     Migraines     Hypothyroid     OA (osteoarthritis)     GERD (gastroesophageal reflux disease)     Vitamin B 12 deficiency     Celiac disease     Diagnostic skin and sensitization tests(aka ALLERGENS)     VMR (vasomotor rhinitis)     Chronic urticaria     Advanced directives, counseling/discussion     Hot flashes     Nonspecific abnormal electrocardiogram (ECG) (EKG)     Major depressive disorder, recurrent episode, moderate (H)     Hyperlipidemia LDL goal <130     Microhematuria     Essential hypertension with goal blood pressure less than 140/90     Past Surgical History:   Procedure Laterality Date     TUBAL LIGATION  1987       Social History   Substance Use Topics     Smoking status: Former Smoker     Packs/day: 1.50     Types: Cigarettes     Quit date: 1/1/1990     Smokeless tobacco: Never Used     Alcohol use No      Comment: Ocassionally     Family History   Problem Relation Age of Onset     Allergies Mother      Psychotic Disorder Mother      Blood Disease Father      auto immune pernicious anemia     DIABETES Father      Neurologic Disorder Father      migraine     Allergies Maternal Grandmother      DIABETES Paternal Grandmother      Alcohol/Drug Brother      Alcohol/Drug Daughter      Depression Daughter      suicide attempt     Thyroid Disease Brother      hyperthyroid     Respiratory Brother          Current Outpatient Prescriptions   Medication Sig Dispense Refill     cefuroxime (CEFTIN) 500 MG tablet Take 1 tablet (500 mg) by mouth 2 times daily 20 tablet 0     venlafaxine (EFFEXOR-XR) 75 MG 24 hr capsule TAKE 3 CAPSULES (225MG)    DAILY 270 capsule 0      ARIPiprazole (ABILIFY) 2 MG tablet Take 1/2 pill at bedtime 45 tablet 0     lovastatin (MEVACOR) 10 MG tablet Take 1 tablet (10 mg) by mouth At Bedtime 90 tablet 0     topiramate (TOPAMAX) 100 MG tablet TAKE 1 TABLET TWICE A  tablet 0     amLODIPine (NORVASC) 10 MG tablet TAKE 1 TABLET DAILY 90 tablet 1     lisinopril (PRINIVIL/ZESTRIL) 5 MG tablet TAKE 1 TABLET DAILY 90 tablet 1     LANsoprazole (PREVACID) 30 MG CR capsule Take 1 capsule (30 mg) by mouth daily 90 capsule 3     levothyroxine (SYNTHROID/LEVOTHROID) 137 MCG tablet Take 1 tablet (137 mcg) by mouth daily ELENO. 90 tablet 2     order for DME Equipment being ordered: CPAP  AIRSENSE 10  WISP LARGE  5-15 CM H2O  SN# [id number]  DN# [id number]       traZODone (DESYREL) 50 MG tablet Take 1 tablet (50 mg) by mouth At Bedtime 90 tablet 3     order for DME Equipment being ordered: BP monitor 1 each 0     Cyanocobalamin (VITAMIN B-12 SL) Place 1 tablet under the tongue daily.       zinc 50 MG TABS Take 1 tablet by mouth daily.       Cholecalciferol (VITAMIN D3) 1000 UNIT TABS Take 1 tablet by mouth daily.       Multiple Vitamin (MULTIVITAMIN OR) Take  by mouth.       aspirin 81 MG tablet Take 1 tablet by mouth daily.       albuterol (PROAIR HFA/PROVENTIL HFA/VENTOLIN HFA) 108 (90 BASE) MCG/ACT Inhaler Inhale 2 puffs into the lungs every 4 hours as needed for shortness of breath / dyspnea or wheezing 1 Inhaler 0     Allergies   Allergen Reactions     Imitrex [Sumatriptan] Other (See Comments)     Worsening headache     Penicillins Hives     hives     Dairy [Milk Products]      Migraines and upset stomach     Gluten      Recent Labs   Lab Test  08/03/16   1302  07/18/16   0919  01/06/16   0931  11/24/15   0711  06/22/15   1530  04/07/15   0822  01/21/14   0904   06/15/12   0850   03/29/11   1844   LDL   --    --   110*   --    --   117  132*   --   144*   --   128   HDL   --    --   86   --    --   86  77   --   71   --   91   TRIG   --    --   66   --    --    "69  67   --   68   --   65   ALT   --    --    --    --   28   --    --    --   11   --   14   CR   --   0.91  0.90   --   0.86  0.85  0.99   --    --    --   0.94   GFRESTIMATED   --   63  64   --   67  68  58*   --    --    --   62   GFRESTBLACK   --   76  77   --   81  82  70   --    --    --   75   POTASSIUM   --   4.4  4.1   --   3.6  4.4  4.0   --    --    --   3.7   TSH  0.72   --    --   0.48  0.24*  0.09*  0.70   < >  2.49   < >   --     < > = values in this interval not displayed.      BP Readings from Last 3 Encounters:   03/27/17 118/74   03/16/17 120/80   11/04/16 128/80    Wt Readings from Last 3 Encounters:   03/27/17 196 lb 8 oz (89.1 kg)   03/16/17 200 lb (90.7 kg)   11/04/16 198 lb 3.2 oz (89.9 kg)         Labs reviewed in EPIC    Reviewed and updated as needed this visit by clinical staff  Tobacco  Allergies  Meds       Reviewed and updated as needed this visit by Provider         ROS:  Constitutional, HEENT, cardiovascular, pulmonary, gi and gu systems are negative, except as otherwise noted.    This document serves as a record of the services and decisions personally performed and made by Mary Carmen Rdz MD. It was created on her behalf by Monisha Randall, a trained medical scribe. The creation of this document is based the provider's statements to the medical scribe.    Monisha Randall January 4, 2017 8:23 AM    OBJECTIVE:                                                    /74  Pulse 104  Temp 98.4  F (36.9  C) (Oral)  Ht 5' 8.23\" (1.733 m)  Wt 196 lb 8 oz (89.1 kg)  SpO2 95%  BMI 29.68 kg/m2  Body mass index is 29.68 kg/(m^2).  GENERAL: healthy, alert and no distress  HENT: normal cephalic/atraumatic, ear canals and TM's normal, nose and mouth without ulcers or lesions, oropharynx clear, oral mucous membranes moist and sinuses: maxillary tenderness bilateral and ethmoid tenderness  NECK: no adenopathy, no asymmetry, masses, or scars and thyroid normal to palpation  RESP: lungs clear to " auscultation - no rales, rhonchi or wheezes  CV: regular rate and rhythm, normal S1 S2, no S3 or S4, no murmur, click or rub, no peripheral edema and peripheral pulses strong  ABDOMEN: soft, nontender, no hepatosplenomegaly, no masses and bowel sounds normal  PSYCH: mentation appears normal, affect normal/bright    Diagnostic Test Results:  Results for orders placed or performed during the hospital encounter of 03/16/17   Chest XR,  PA & LAT    Narrative    XR CHEST 2 VW 3/16/2017 3:54 PM    COMPARISON: None.    HISTORY: Cough      Impression    IMPRESSION: Mild left basilar atelectasis/consolidation. Right lung is  clear. No pleural effusion or pneumothorax. Cardiac silhouette within  normal limits.    LYNN LALA   Influenza A/B antigen   Result Value Ref Range    Influenza A/B Agn Specimen Nasopharyngeal     Influenza A Negative NEG    Influenza B (A) NEG     Positive   Test results must be correlated with clinical data. If necessary, results   should be confirmed by a molecular assay or viral culture.          ASSESSMENT/PLAN:                                                          ICD-10-CM    1. Acute sinusitis with symptoms > 10 days J01.90 cefuroxime (CEFTIN) 500 MG tablet   2. Major depressive disorder, recurrent episode, moderate (H) F33.1    3. Hyperlipidemia LDL goal <130 E78.5 Lipid panel reflex to direct LDL   4. Other specified hypothyroidism E03.8 TSH with free T4 reflex   5. Encounter for therapeutic drug monitoring Z51.81 Basic metabolic panel   6. Vitamin B 12 deficiency E53.8 Vitamin B12     Will treat sinusitis with ceftin. See below for self-care advice  Mdd: not well controlled. Discussed change in abilify vs counseling vs recheck at next visit next month-- prefers to wait. Plan to increase meds if not improving  Will have previsit labs-- ordered today    Patient Instructions   Take ceftin 1 pill twice a day for 10 days  Lots of fluids and rest  Upper respiratory infections are usually  caused by viruses and, sometimes certain bacteria.  Antibiotics don't help the vast majority of people recover any quicker even when caused by a bacteria.  The body will fight this infection but it needs to be treated well in order to help heal itself.  Rest as needed.  It is ok to reduce food intake if appetite is poor but it is quite important to maintain/increase fluid intake.    For cough, use equal parts honey and lemon juice as needed. 2 tsp every 4 hours as needed.     For nasal congestion and sinus pressure, use Apply warm facial packs for 5-10 minutes three times daily.  Drink plenty of fluids- 6 to 10 glasses of liquids each day.  Rest.  Saline drops or nasal sprays as needed.  Contact primary care clinic for increasing pain, high fever, vision changes, worsening symptoms, or no relief from symptoms after 7-10 days.      The information in this document, created by the medical scribe for me, accurately reflects the services I personally performed and the decisions made by me. I have reviewed and approved this document for accuracy prior to leaving the patient care area.    Mary Carmen Rdz MD  [facility]

## 2017-03-27 ENCOUNTER — OFFICE VISIT (OUTPATIENT)
Dept: FAMILY MEDICINE | Facility: CLINIC | Age: 62
End: 2017-03-27
Payer: COMMERCIAL

## 2017-03-27 VITALS
HEIGHT: 68 IN | OXYGEN SATURATION: 95 % | DIASTOLIC BLOOD PRESSURE: 74 MMHG | WEIGHT: 196.5 LBS | HEART RATE: 104 BPM | BODY MASS INDEX: 29.78 KG/M2 | TEMPERATURE: 98.4 F | SYSTOLIC BLOOD PRESSURE: 118 MMHG

## 2017-03-27 DIAGNOSIS — E03.8 OTHER SPECIFIED HYPOTHYROIDISM: ICD-10-CM

## 2017-03-27 DIAGNOSIS — E78.5 HYPERLIPIDEMIA LDL GOAL <130: ICD-10-CM

## 2017-03-27 DIAGNOSIS — E53.8 VITAMIN B 12 DEFICIENCY: ICD-10-CM

## 2017-03-27 DIAGNOSIS — J01.90 ACUTE SINUSITIS WITH SYMPTOMS > 10 DAYS: Primary | ICD-10-CM

## 2017-03-27 DIAGNOSIS — Z51.81 ENCOUNTER FOR THERAPEUTIC DRUG MONITORING: ICD-10-CM

## 2017-03-27 DIAGNOSIS — F33.1 MAJOR DEPRESSIVE DISORDER, RECURRENT EPISODE, MODERATE (H): ICD-10-CM

## 2017-03-27 PROCEDURE — 99214 OFFICE O/P EST MOD 30 MIN: CPT | Performed by: FAMILY MEDICINE

## 2017-03-27 RX ORDER — ARIPIPRAZOLE 2 MG/1
TABLET ORAL
Qty: 45 TABLET | Refills: 0 | Status: CANCELLED | OUTPATIENT
Start: 2017-03-27

## 2017-03-27 RX ORDER — VENLAFAXINE HYDROCHLORIDE 75 MG/1
CAPSULE, EXTENDED RELEASE ORAL
Qty: 270 CAPSULE | Refills: 0 | Status: CANCELLED | OUTPATIENT
Start: 2017-03-27

## 2017-03-27 RX ORDER — CEFUROXIME AXETIL 500 MG/1
500 TABLET ORAL 2 TIMES DAILY
Qty: 20 TABLET | Refills: 0 | Status: SHIPPED | OUTPATIENT
Start: 2017-03-27 | End: 2017-07-26

## 2017-03-27 RX ORDER — LOVASTATIN 10 MG
10 TABLET ORAL AT BEDTIME
Qty: 90 TABLET | Refills: 0 | Status: CANCELLED | OUTPATIENT
Start: 2017-03-27

## 2017-03-27 RX ORDER — TOPIRAMATE 100 MG/1
100 TABLET, FILM COATED ORAL 2 TIMES DAILY
Qty: 180 TABLET | Refills: 0 | Status: CANCELLED | OUTPATIENT
Start: 2017-03-27

## 2017-03-27 ASSESSMENT — ANXIETY QUESTIONNAIRES
1. FEELING NERVOUS, ANXIOUS, OR ON EDGE: SEVERAL DAYS
6. BECOMING EASILY ANNOYED OR IRRITABLE: MORE THAN HALF THE DAYS
IF YOU CHECKED OFF ANY PROBLEMS ON THIS QUESTIONNAIRE, HOW DIFFICULT HAVE THESE PROBLEMS MADE IT FOR YOU TO DO YOUR WORK, TAKE CARE OF THINGS AT HOME, OR GET ALONG WITH OTHER PEOPLE: SOMEWHAT DIFFICULT
7. FEELING AFRAID AS IF SOMETHING AWFUL MIGHT HAPPEN: SEVERAL DAYS
5. BEING SO RESTLESS THAT IT IS HARD TO SIT STILL: SEVERAL DAYS
2. NOT BEING ABLE TO STOP OR CONTROL WORRYING: NOT AT ALL
3. WORRYING TOO MUCH ABOUT DIFFERENT THINGS: SEVERAL DAYS
GAD7 TOTAL SCORE: 6

## 2017-03-27 ASSESSMENT — PATIENT HEALTH QUESTIONNAIRE - PHQ9: 5. POOR APPETITE OR OVEREATING: NOT AT ALL

## 2017-03-27 NOTE — NURSING NOTE
"Chief Complaint   Patient presents with     ER F/U     [facility] 03/16/17, Influenza and pneumonia     Depression     Health Maintenance     PHQ-9 DAP, lipid, Mammo, Colon cancer screening       Initial /74  Pulse 104  Temp 98.4  F (36.9  C) (Oral)  Ht 5' 8.23\" (1.733 m)  Wt 196 lb 8 oz (89.1 kg)  SpO2 95%  BMI 29.68 kg/m2 Estimated body mass index is 29.68 kg/(m^2) as calculated from the following:    Height as of this encounter: 5' 8.23\" (1.733 m).    Weight as of this encounter: 196 lb 8 oz (89.1 kg).  Medication Reconciliation: complete    "

## 2017-03-27 NOTE — PATIENT INSTRUCTIONS
Take ceftin 1 pill twice a day for 10 days  Lots of fluids and rest  Upper respiratory infections are usually caused by viruses and, sometimes certain bacteria.  Antibiotics don't help the vast majority of people recover any quicker even when caused by a bacteria.  The body will fight this infection but it needs to be treated well in order to help heal itself.  Rest as needed.  It is ok to reduce food intake if appetite is poor but it is quite important to maintain/increase fluid intake.    For cough, use equal parts honey and lemon juice as needed. 2 tsp every 4 hours as needed.     For nasal congestion and sinus pressure, use Apply warm facial packs for 5-10 minutes three times daily.  Drink plenty of fluids- 6 to 10 glasses of liquids each day.  Rest.  Saline drops or nasal sprays as needed.  Contact primary care clinic for increasing pain, high fever, vision changes, worsening symptoms, or no relief from symptoms after 7-10 days.        [facility]    If you have any questions regarding to your visit please contact your care team:       Team Purple:   Clinic Hours Telephone Number   [address] Dr[address]   7am-7pm  Monday - Thursday   7am-5pm  Fridays  [phone]  (Appointment scheduling available 24/7)    Questions about your Visit?   Team Line:  [phone]   Urgent Care - [place] - 11am-9pm Monday-Friday Saturday-Sunday- 9am-5pm   [place] - 5pm-9pm Monday-Friday Saturday-Sunday- 9am-5pm  [phone] - [place]   [phone] [facility]       What options do I have for visits at the clinic other than the traditional office visit?  To expand how we care for you, many of our providers are utilizing electronic visits (e-visits) and telephone visits, when medically appropriate, for interactions with their patients rather than a visit in the clinic.   We also offer nurse visits for many medical concerns. Just  like any other service, we will bill your insurance company for this type of visit based on time spent on the phone with your provider. Not all insurance companies cover these visits. Please check with your medical insurance if this type of visit is covered. You will be responsible for any charges that are not paid by your insurance.      E-visits via Allocabhart:  generally incur a $35.00 fee.  Telephone visits:  Time spent on the phone: *charged based on time that is spent on the phone in increments of 10 minutes. Estimated cost:   5-10 mins $30.00   11-20 mins. $59.00   21-30 mins. $85.00     Use NXVISION (secure email communication and access to your chart) to send your primary care provider a message or make an appointment. Ask someone on your Team how to sign up for NXVISION.  For a Price Quote for your services, please call our Consumer Price Line at [phone].  As always, Thank you for trusting us with your health care needs!      Melia Hackett, CMA

## 2017-03-27 NOTE — MR AVS SNAPSHOT
After Visit Summary   3/27/2017    Mary Grace Tompkins    MRN: [id number]           Patient Information     Date Of Birth          1955        Visit Information        Provider Department      3/27/2017 11:45 AM Mary Carmen Rdz MD [facility]        Today's Diagnoses     Acute sinusitis with symptoms > 10 days    -  1    Major depressive disorder, recurrent episode, moderate (H)        Hyperlipidemia LDL goal <130        Other specified hypothyroidism        Encounter for therapeutic drug monitoring        Vitamin B 12 deficiency          Care Instructions    Take ceftin 1 pill twice a day for 10 days  Lots of fluids and rest  Upper respiratory infections are usually caused by viruses and, sometimes certain bacteria.  Antibiotics don't help the vast majority of people recover any quicker even when caused by a bacteria.  The body will fight this infection but it needs to be treated well in order to help heal itself.  Rest as needed.  It is ok to reduce food intake if appetite is poor but it is quite important to maintain/increase fluid intake.    For cough, use equal parts honey and lemon juice as needed. 2 tsp every 4 hours as needed.     For nasal congestion and sinus pressure, use Apply warm facial packs for 5-10 minutes three times daily.  Drink plenty of fluids- 6 to 10 glasses of liquids each day.  Rest.  Saline drops or nasal sprays as needed.  Contact primary care clinic for increasing pain, high fever, vision changes, worsening symptoms, or no relief from symptoms after 7-10 days.        [facility]    If you have any questions regarding to your visit please contact your care team:       Team Purple:   Clinic Hours Telephone Number   [address] Dr.   7am-7pm  Monday - Thursday   7am-5pm  Fridays  [phone]  (Appointment scheduling available 24/7)    Questions about your Visit?   Team Line:  [phone]    Urgent Care - [place] and [place] - 11am-9pm Monday-Friday Saturday-Sunday- 9am-5pm   [place] - 5pm-9pm Monday-Friday Saturday-Sunday- 9am-5pm  [phone] - [place]   [phone] - [place]       What options do I have for visits at the clinic other than the traditional office visit?  To expand how we care for you, many of our providers are utilizing electronic visits (e-visits) and telephone visits, when medically appropriate, for interactions with their patients rather than a visit in the clinic.   We also offer nurse visits for many medical concerns. Just like any other service, we will bill your insurance company for this type of visit based on time spent on the phone with your provider. Not all insurance companies cover these visits. Please check with your medical insurance if this type of visit is covered. You will be responsible for any charges that are not paid by your insurance.      E-visits via [email or web address]:  generally incur a $35.00 fee.  Telephone visits:  Time spent on the phone: *charged based on time that is spent on the phone in increments of 10 minutes. Estimated cost:   5-10 mins $30.00   11-20 mins. $59.00   21-30 mins. $85.00     Use Kijamii Villaget (secure email communication and access to your chart) to send your primary care provider a message or make an appointment. Ask someone on your Team how to sign up for [email or web address].  For a Price Quote for your services, please call our Consumer Price Line at [phone].  As always, Thank you for trusting us with your health care needs!      Melia Hackett CMA          Follow-ups after your visit        Your next 10 appointments already scheduled     Apr 21, 2017  7:30 AM CDT   LAB with [facility]   [facility] [place] [facility]    [address]  [place] [address] [phone]           Patient must bring picture ID.  Patient should be prepared to give a urine specimen  Please do not eat 10-12 hours  before your appointment if you are coming in fasting for labs on lipids, cholesterol, or glucose (sugar).  Pregnant women should follow their Care Team instructions. Water with medications is okay. Do not drink coffee or other fluids.   If you have concerns about taking  your medications, please ask at office or if scheduling via Vivint, send a message by clicking on Secure Messaging, Message Your Care Team.            Apr 28, 2017 12:30 PM CDT   PHYSICAL with Mary Carmen Rdz MD   [facility]    [address]  [place] [address] [phone]              Future tests that were ordered for you today     Open Future Orders        Priority Expected Expires Ordered    Lipid panel reflex to direct LDL Routine  6/27/2017 3/27/2017    Basic metabolic panel Routine  6/27/2017 3/27/2017    TSH with free T4 reflex Routine  6/27/2017 3/27/2017    Vitamin B12 Routine  6/27/2017 3/27/2017            Who to contact     If you have questions or need follow up information about today's clinic visit or your schedule please contact [facility] directly at [phone].  Normal or non-critical lab and imaging results will be communicated to you by BioTrovehart, letter or phone within 4 business days after the clinic has received the results. If you do not hear from us within 7 days, please contact the clinic through BioTrovehart or phone. If you have a critical or abnormal lab result, we will notify you by phone as soon as possible.  Submit refill requests through Vivint or call your pharmacy and they will forward the refill request to us. Please allow 3 business days for your refill to be completed.          Additional Information About Your Visit        Vivint Information     Vivint gives you secure access to your electronic health record. If you see a primary care provider, you can also send messages to your care team and make appointments. If you have questions, please  "call your primary care clinic.  If you do not have a primary care provider, please call [phone] and they will assist you.        Care EveryWhere ID     This is your Care EveryWhere ID. This could be used by other organizations to access your [place] medical records  [id number]        Your Vitals Were     Pulse Temperature Height Pulse Oximetry BMI (Body Mass Index)       104 98.4  F (36.9  C) (Oral) 5' 8.23\" (1.733 m) 95% 29.68 kg/m2        Blood Pressure from Last 3 Encounters:   03/27/17 118/74   03/16/17 120/80   11/04/16 128/80    Weight from Last 3 Encounters:   03/27/17 196 lb 8 oz (89.1 kg)   03/16/17 200 lb (90.7 kg)   11/04/16 198 lb 3.2 oz (89.9 kg)                 Today's Medication Changes          These changes are accurate as of: 3/27/17 12:36 PM.  If you have any questions, ask your nurse or doctor.               Start taking these medicines.        Dose/Directions    cefuroxime 500 MG tablet   Commonly known as:  CEFTIN   Used for:  Acute sinusitis with symptoms > 10 days   Started by:  Mary Carmen Rdz MD        Dose:  500 mg   Take 1 tablet (500 mg) by mouth 2 times daily   Quantity:  20 tablet   Refills:  0            Where to get your medicines      These medications were sent to [place] [address] [place] [address] [address]     Phone:  [phone]     cefuroxime 500 MG tablet                Primary Care Provider Office Phone # Fax #    Mary Carmen Rdz [phone]       [facility] [address] [place] [address]        Thank you!     Thank you for choosing [facility]  for your care. Our goal is always to provide you with excellent care. Hearing back from our patients is one way we can continue to improve our services. Please take a few minutes to complete the written survey that you may receive in the mail after your visit with us. Thank you!             Your Updated " Medication List - Protect others around you: Learn how to safely use, store and throw away your medicines at www.disposemymeds.org.          This list is accurate as of: 3/27/17 12:36 PM.  Always use your most recent med list.                   Brand Name Dispense Instructions for use    albuterol 108 (90 BASE) MCG/ACT Inhaler    PROAIR HFA/PROVENTIL HFA/VENTOLIN HFA    1 Inhaler    Inhale 2 puffs into the lungs every 4 hours as needed for shortness of breath / dyspnea or wheezing       amLODIPine 10 MG tablet    NORVASC    90 tablet    TAKE 1 TABLET DAILY       ARIPiprazole 2 MG tablet    ABILIFY    45 tablet    Take 1/2 pill at bedtime       aspirin 81 MG tablet      Take 1 tablet by mouth daily.       cefuroxime 500 MG tablet    CEFTIN    20 tablet    Take 1 tablet (500 mg) by mouth 2 times daily       cholecalciferol 1000 UNIT tablet    vitamin D     Take 1 tablet by mouth daily.       LANsoprazole 30 MG CR capsule    PREVACID    90 capsule    Take 1 capsule (30 mg) by mouth daily       levothyroxine 137 MCG tablet    SYNTHROID/LEVOTHROID    90 tablet    Take 1 tablet (137 mcg) by mouth daily ELENO.       lisinopril 5 MG tablet    PRINIVIL/ZESTRIL    90 tablet    TAKE 1 TABLET DAILY       lovastatin 10 MG tablet    MEVACOR    90 tablet    Take 1 tablet (10 mg) by mouth At Bedtime       MULTIVITAMIN PO      Take  by mouth.       order for DME      Equipment being ordered: CPAP AIRSENSE 10 WISP LARGE 5-15 CM H2O # [id number]  DN# [id number]       order for DME     1 each    Equipment being ordered: BP monitor       topiramate 100 MG tablet    TOPAMAX    180 tablet    TAKE 1 TABLET TWICE A DAY       traZODone 50 MG tablet    DESYREL    90 tablet    Take 1 tablet (50 mg) by mouth At Bedtime       venlafaxine 75 MG 24 hr capsule    EFFEXOR-XR    270 capsule    TAKE 3 CAPSULES (225MG)    DAILY       VITAMIN B-12 SL      Place 1 tablet under the tongue daily.       zinc 50 MG Tabs      Take 1 tablet by mouth daily.

## 2017-03-27 NOTE — LETTER
[address] [address]  [phone]      March 27, 2017      RE: Mary Grace Tompkins  [address] [facility] [address]       To whom it may concern:    Mary Grace Tompkins was seen in our clinic today. She was recently diagnosed with influenza and pneumonia. Today she was diagnosed with a sinus infection. She is likely to be ill for 2-3 more days, then able to return to work.           Sincerely,        Mary Carmen Rdz MD

## 2017-03-28 ASSESSMENT — PATIENT HEALTH QUESTIONNAIRE - PHQ9: SUM OF ALL RESPONSES TO PHQ QUESTIONS 1-9: 9

## 2017-03-28 ASSESSMENT — ANXIETY QUESTIONNAIRES: GAD7 TOTAL SCORE: 6

## 2017-05-19 ENCOUNTER — TELEPHONE (OUTPATIENT)
Dept: FAMILY MEDICINE | Facility: CLINIC | Age: 62
End: 2017-05-19

## 2017-05-19 DIAGNOSIS — I10 ESSENTIAL HYPERTENSION WITH GOAL BLOOD PRESSURE LESS THAN 140/90: ICD-10-CM

## 2017-05-19 NOTE — TELEPHONE ENCOUNTER
Reason for Call:  Other prescription    Detailed comments: Patient is requesting refills of Lisinopril and Amlodipine. Patient has new insurance and needs filled thru local pharmacy. Will be out of Lisinopril tomorrow and has about 1 week left of the Amlodipine. All her other medications will need new renewals thru the mail order pharmacy [facility] mail order starting next week. She is sorry for the late notice due to insurance issues.    Phone Number Patient can be reached at:  [phone]    Best Time: anytime    Can we leave a detailed message on this number? YES    Call taken on 5/19/2017 at 3:24 PM by Brenda Henry

## 2017-05-22 RX ORDER — LISINOPRIL 5 MG/1
5 TABLET ORAL DAILY
Qty: 90 TABLET | Refills: 0 | Status: SHIPPED | OUTPATIENT
Start: 2017-05-22 | End: 2017-06-23

## 2017-05-22 RX ORDER — AMLODIPINE BESYLATE 10 MG/1
10 TABLET ORAL DAILY
Qty: 90 TABLET | Refills: 0 | Status: SHIPPED | OUTPATIENT
Start: 2017-05-22 | End: 2017-06-23

## 2017-05-22 NOTE — TELEPHONE ENCOUNTER
Reason for call: med refill  Patient called regarding (reason for call): prescription-Lisinopril 5mg and Amlodipine 10mg   Additional comments: Is out and would like it filled at [facility] pharmacy    Phone Number Pt can be reached at: Home number on file [phone] (home)  Best Time: anytime  Can we leave a detailed message on this number? YES

## 2017-05-22 NOTE — TELEPHONE ENCOUNTER
Please see below message.    Lisinopril 5mg      Last Written Prescription Date: 01/24/2017  Last Fill Quantity: 90, # refills: 1  Last Office Visit with [facility] or  Health prescribing provider: 04/28/2017  Next 5 appointments (look out 90 days)     Jul 26, 2017  1:30 PM CDT   PHYSICAL with Mary Carmen Rdz MD   [facility] ([facility])    [address]  [place] [address]   [phone]                   Potassium   Date Value Ref Range Status   07/18/2016 4.4 3.4 - 5.3 mmol/L Final     Creatinine   Date Value Ref Range Status   07/18/2016 0.91 0.52 - 1.04 mg/dL Final     BP Readings from Last 3 Encounters:   03/27/17 118/74   03/16/17 120/80   11/04/16 128/80     Amlodipine 10mg      Last Written Prescription Date: 01/24/2017  Last Fill Quantity: 90, # refills: 1    Last Office Visit with [facility] or  Health prescribing provider:  04/28/2017   Future Office Visit:    Next 5 appointments (look out 90 days)     Jul 26, 2017  1:30 PM CDT   PHYSICAL with Mary Carmen Rdz MD   [facility] ([facility])    [address]  [place] [address]   [phone]                    BP Readings from Last 3 Encounters:   03/27/17 118/74   03/16/17 120/80   11/04/16 128/80

## 2017-05-22 NOTE — TELEPHONE ENCOUNTER
Spoke with patient her current prescriptions are at [facility] mail order and her insurance would prefer [facility] but they are not able to transfer prescriptions.  Prescription for Lisinopril and Amlodipine sent to local pharmacy per patient request.  [facility] with patient and added as preferred pharmacy.  She will call back if she needs the other prescriptions sent to new pharmacy, she is not sure how much she has left.  Donna Garza RN

## 2017-06-01 ENCOUNTER — DOCUMENTATION ONLY (OUTPATIENT)
Dept: LAB | Facility: CLINIC | Age: 62
End: 2017-06-01

## 2017-06-01 DIAGNOSIS — E03.8 OTHER SPECIFIED HYPOTHYROIDISM: Primary | ICD-10-CM

## 2017-06-01 DIAGNOSIS — E78.5 HYPERLIPIDEMIA LDL GOAL <130: ICD-10-CM

## 2017-06-01 DIAGNOSIS — R31.29 MICROHEMATURIA: ICD-10-CM

## 2017-06-01 DIAGNOSIS — E53.8 VITAMIN B 12 DEFICIENCY: ICD-10-CM

## 2017-06-01 DIAGNOSIS — I10 ESSENTIAL HYPERTENSION WITH GOAL BLOOD PRESSURE LESS THAN 140/90: ICD-10-CM

## 2017-06-23 ENCOUNTER — TELEPHONE (OUTPATIENT)
Dept: FAMILY MEDICINE | Facility: CLINIC | Age: 62
End: 2017-06-23

## 2017-06-23 DIAGNOSIS — F33.1 MAJOR DEPRESSIVE DISORDER, RECURRENT EPISODE, MODERATE (H): ICD-10-CM

## 2017-06-23 DIAGNOSIS — E78.5 HYPERLIPIDEMIA LDL GOAL <130: ICD-10-CM

## 2017-06-23 DIAGNOSIS — G43.709 CHRONIC MIGRAINE WITHOUT AURA WITHOUT STATUS MIGRAINOSUS, NOT INTRACTABLE: ICD-10-CM

## 2017-06-23 DIAGNOSIS — K21.9 GASTROESOPHAGEAL REFLUX DISEASE WITHOUT ESOPHAGITIS: ICD-10-CM

## 2017-06-23 DIAGNOSIS — F51.04 PSYCHOPHYSIOLOGIC INSOMNIA: ICD-10-CM

## 2017-06-23 DIAGNOSIS — E03.8 OTHER SPECIFIED HYPOTHYROIDISM: ICD-10-CM

## 2017-06-23 DIAGNOSIS — I10 ESSENTIAL HYPERTENSION WITH GOAL BLOOD PRESSURE LESS THAN 140/90: ICD-10-CM

## 2017-06-23 NOTE — TELEPHONE ENCOUNTER
Pt needs all medications refilled, needs extensions as will not make until 7-26-17 appointment. [facility] [phone]. Please call pt at the above # to advise when done.

## 2017-06-23 NOTE — TELEPHONE ENCOUNTER
Spoke to patient and found out which medications she is requesting refills on.    amLODIPine (NORVASC) 10 MG tablet      Last Written Prescription Date: 5/22/2017  Last Fill Quantity: 90, # refills: 0    [place] [facility] [place] [facility] or  Health prescribing provider:  4/28/2017   Future Office Visit:    Next 5 appointments (look out 90 days)     Jul 26, 2017  1:30 PM CDT   PHYSICAL with Mary Carmen Rdz MD   [facility] ([facility])    [address] [place] [address]   [phone]                    BP Readings from Last 3 Encounters:   03/27/17 118/74   03/16/17 120/80   11/04/16 128/80     lisinopril (PRINIVIL/ZESTRIL) 5 MG tablet      Last Written Prescription Date: 5/22/2017  Last Fill Quantity: 90, # refills: 0  [place] [facility] [place] [facility] or  Health prescribing provider: 4/28/2017  Next 5 appointments (look out 90 days)     Jul 26, 2017  1:30 PM CDT   PHYSICAL with Mary Carmen Rdz MD   [facility] ([facility])    [address]  [place] [address]   [phone]                   Potassium   Date Value Ref Range Status   07/18/2016 4.4 3.4 - 5.3 mmol/L Final     Creatinine   Date Value Ref Range Status   07/18/2016 0.91 0.52 - 1.04 mg/dL Final     BP Readings from Last 3 Encounters:   03/27/17 118/74   03/16/17 120/80   11/04/16 128/80     lovastatin (MEVACOR) 10 MG tablet     Last Written Prescription Date: 3/22/2017  Last Fill Quantity: 90, # refills: 0  [place] [facility] [place] [facility] or  Health prescribing provider: 4/28/2017  Next 5 appointments (look out 90 days)     Jul 26, 2017  1:30 PM CDT   PHYSICAL with Mary Carmen Rdz MD   [facility] ([facility]    [address]  [place] [address]   [phone]                   Lab Results   Component Value Date    CHOL 209 01/06/2016     Lab Results   Component Value Date    HDL 86 01/06/2016     Lab Results   Component Value Date      01/06/2016     Lab Results   Component Value Date    TRIG 66 01/06/2016     Lab Results   Component Value Date    CHOLHDLRATIO 2.5 04/07/2015     ARIPiprazole (ABILIFY) 2 MG tablet     Last Written Prescription Date: 3/22/2017  Last Fill Quantity: 45, # refills: 0  Last [facility] Visit [facility] or  Health prescribing provider: 4/28/2017  Next 5 appointments (look out 90 days)     Jul 26, 2017  1:30 PM CDT   PHYSICAL with Mary Carmen Rdz MD   [facility] ([facility])    [address] [address]   [phone]                   BP Readings from Last 3 Encounters:   03/27/17 118/74   03/16/17 120/80   11/04/16 128/80     Pulse Readings from Last 2 Encounters:   03/27/17 104   03/16/17 95     Lab Results   Component Value Date    GLC 94 07/18/2016     Lab Results   Component Value Date    WBC 9.2 06/22/2015     Lab Results   Component Value Date    RBC 4.51 06/22/2015     Lab Results   Component Value Date    HGB 13.9 06/22/2015     Lab Results   Component Value Date    HCT 42.7 06/22/2015     No components found for: MCT  Lab Results   Component Value Date    MCV 95 06/22/2015     Lab Results   Component Value Date    MCH 30.8 06/22/2015     Lab Results   Component Value Date    MCHC 32.6 06/22/2015     Lab Results   Component Value Date    RDW 15.6 06/22/2015     Lab Results   Component Value Date     06/22/2015     Lab Results   Component Value Date    CHOL 209 01/06/2016     Lab Results   Component Value Date    HDL 86 01/06/2016     Lab Results   Component Value Date     01/06/2016     Lab Results   Component Value Date    TRIG 66 01/06/2016     Lab Results   Component Value Date    CHOLHDLRATIO 2.5 04/07/2015     topiramate (TOPAMAX) 100 MG tablet       Last Written Prescription Date: 3/17/2017  Last Fill Quantity: 180, # refills: 0    Last Office Visit with [facility] or  Health prescribing provider:  4/28/2017   Future Office Visit:     Next 5 appointments (look out 90 days)     Jul 26, 2017  1:30 PM CDT   PHYSICAL with Mary Carmen Rdz MD   [facility] ([facility])    [address] [place] [address]  [place] [address] [phone]                  Creatinine   Date Value Ref Range Status   07/18/2016 0.91 0.52 - 1.04 mg/dL Final     venlafaxine (EFFEXOR-XR) 75 MG 24 hr capsule    Last Written Prescription Date: 3/22/2017  Last Fill Quantity: 270, # refills: 0  Last Office Visit with [facility] or  Health prescribing provider: 4/28/2017   Next 5 appointments (look out 90 days)     Jul 26, 2017  1:30 PM CDT   PHYSICAL with Mary Carmen Rdz MD   [facility] ([facility])    [address] [place] [address]  [place] [address] [phone]                   BP Readings from Last 3 Encounters:   03/27/17 118/74   03/16/17 120/80   11/04/16 128/80     Pulse: (for Fetzima)  Creatinine   Date Value Ref Range Status   07/18/2016 0.91 0.52 - 1.04 mg/dL Final   ]    Last PHQ-9 score on record=   PHQ-9 SCORE 3/27/2017   Total Score 9     traZODone (DESYREL) 50 MG tablet       Last Written Prescription Date: 8/3/2016  Last Fill Quantity: 90; # refills: 3  Last Office Visit with [facility] [place] or  Health prescribing provider:  4/28/2017   Next 5 appointments (look out 90 days)     Jul 26, 2017  1:30 PM CDT   PHYSICAL with Mary Carmen Rdz MD   [facility] ([facility])    [address] [address]   [phone]                   Last PHQ-9 score on record=   PHQ-9 SCORE 3/27/2017   Total Score 9       Lab Results   Component Value Date    AST 20 06/22/2015     Lab Results   Component Value Date    ALT 28 06/22/2015     levothyroxine (SYNTHROID/LEVOTHROID) 137 MCG tablet     Last Written Prescription Date: 12/28/2016  Last Quantity: 90, # refills: 2  Last Office Visit with [facility] or [facility] prescribing provider: 4/28/2017   Next 5 appointments (look out 90 days)     Jul  26, 2017  1:30 PM CDT   PHYSICAL with Mary Carmen Rdz MD   [facility] ([facility])    [address]  [place] [address] [phone]                   TSH   Date Value Ref Range Status   08/03/2016 0.72 0.40 - 4.00 mU/L Final     LANsoprazole (PREVACID) 30 MG CR capsule      Last Written Prescription Date: 12/28/2016  Last Fill Quantity: 90,  # refills: 3   Last Office Visit with [facility] or [facility] prescribing provider: 4/28/2017                                         Next 5 appointments (look out 90 days)     Jul 26, 2017  1:30 PM CDT   PHYSICAL with Mary Carmen Rdz MD   [facility] ([facility])    [address] [place] [address] [phone]

## 2017-06-23 NOTE — TELEPHONE ENCOUNTER
You will need to pilo up any medications that are needed for me to approve or deny  Call pharmacy if needed to determine what is needed  Mary Carmen Rdz MD

## 2017-06-26 RX ORDER — LANSOPRAZOLE 30 MG/1
30 CAPSULE, DELAYED RELEASE ORAL DAILY
Qty: 90 CAPSULE | Refills: 0 | Status: SHIPPED | OUTPATIENT
Start: 2017-06-26 | End: 2017-07-26

## 2017-06-26 RX ORDER — LISINOPRIL 5 MG/1
5 TABLET ORAL DAILY
Qty: 90 TABLET | Refills: 0 | Status: SHIPPED | OUTPATIENT
Start: 2017-06-26 | End: 2017-07-26

## 2017-06-26 RX ORDER — TRAZODONE HYDROCHLORIDE 50 MG/1
50 TABLET, FILM COATED ORAL AT BEDTIME
Qty: 90 TABLET | Refills: 0 | Status: SHIPPED | OUTPATIENT
Start: 2017-06-26 | End: 2017-07-26

## 2017-06-26 RX ORDER — LEVOTHYROXINE SODIUM 137 UG/1
137 TABLET ORAL DAILY
Qty: 90 TABLET | Refills: 0 | Status: SHIPPED | OUTPATIENT
Start: 2017-06-26 | End: 2017-07-26

## 2017-06-26 RX ORDER — LOVASTATIN 10 MG
10 TABLET ORAL AT BEDTIME
Qty: 90 TABLET | Refills: 0 | Status: SHIPPED | OUTPATIENT
Start: 2017-06-26 | End: 2018-12-03 | Stop reason: ALTCHOICE

## 2017-06-26 RX ORDER — VENLAFAXINE HYDROCHLORIDE 75 MG/1
CAPSULE, EXTENDED RELEASE ORAL
Qty: 270 CAPSULE | Refills: 0 | Status: SHIPPED | OUTPATIENT
Start: 2017-06-26 | End: 2017-07-26

## 2017-06-26 RX ORDER — TOPIRAMATE 100 MG/1
100 TABLET, FILM COATED ORAL 2 TIMES DAILY
Qty: 180 TABLET | Refills: 0 | Status: SHIPPED | OUTPATIENT
Start: 2017-06-26 | End: 2017-07-26

## 2017-06-26 RX ORDER — ARIPIPRAZOLE 2 MG/1
TABLET ORAL
Qty: 45 TABLET | Refills: 0 | Status: SHIPPED | OUTPATIENT
Start: 2017-06-26 | End: 2017-07-26

## 2017-06-26 RX ORDER — AMLODIPINE BESYLATE 10 MG/1
10 TABLET ORAL DAILY
Qty: 90 TABLET | Refills: 0 | Status: SHIPPED | OUTPATIENT
Start: 2017-06-26 | End: 2017-07-26

## 2017-06-26 NOTE — TELEPHONE ENCOUNTER
Was due to see me in April, but cancelled appointment  Has physical scheduled 7-26 so OK for 90d x 1 with no refills  Mary Carmen Rdz MD

## 2017-07-07 NOTE — PATIENT INSTRUCTIONS
Preventive Health Recommendations  Female Ages 50 - 64    Yearly exam: See your health care provider every year in order to  o Review health changes.   o Discuss preventive care.    o Review your medicines if your doctor has prescribed any.      Get a Pap test every three years (unless you have an abnormal result and your provider advises testing more often).    If you get Pap tests with HPV test, you only need to test every 5 years, unless you have an abnormal result.     You do not need a Pap test if your uterus was removed (hysterectomy) and you have not had cancer.    You should be tested each year for STDs (sexually transmitted diseases) if you're at risk.     Have a mammogram every 1 to 2 years.    Have a colonoscopy at age 50, or have a yearly FIT test (stool test). These exams screen for colon cancer.      Have a cholesterol test every 5 years, or more often if advised.    Have a diabetes test (fasting glucose) every three years. If you are at risk for diabetes, you should have this test more often.     If you are at risk for osteoporosis (brittle bone disease), think about having a bone density scan (DEXA).    Shots: Get a flu shot each year. Get a tetanus shot every 10 years.    Nutrition:     Eat at least 5 servings of fruits and vegetables each day.    Eat whole-grain bread, whole-wheat pasta and brown rice instead of white grains and rice.    Talk to your provider about Calcium and Vitamin D.     Lifestyle    Exercise at least 150 minutes a week (30 minutes a day, 5 days a week). This will help you control your weight and prevent disease.    Limit alcohol to one drink per day.    No smoking.     Wear sunscreen to prevent skin cancer.     See your dentist every six months for an exam and cleaning.    See your eye doctor every 1 to 2 years.      Call to schedule the colonoscopy/upper GI test and to see the sleep doctor  We'll check labs today to see what may be causing fatigue  [facility]    If you have any questions regarding to your visit please contact your care team:       Team Purple:   Clinic Hours Telephone Number   Dr. Viridiana Rdz     7am-7pm  Monday - Thursday   7am-5pm  Fridays  [phone]  (Appointment scheduling available 24/7)    Questions about your Visit?   Team Line:  [phone]   [facility] 11am-9pm Monday-Friday Saturday-Sunday- 9am-5pm   [facility] - 5pm-9pm Monday-Friday Saturday-Sunday- 9am-5pm  [phone] - [place]   [phone] - [place]       What options do I have for visits at the clinic other than the traditional office visit?  To expand how we care for you, many of our providers are utilizing electronic visits (e-visits) and telephone visits, when medically appropriate, for interactions with their patients rather than a visit in the clinic.   We also offer nurse visits for many medical concerns. Just like any other service, we will bill your insurance company for this type of visit based on time spent on the phone with your provider. Not all insurance companies cover these visits. Please check with your medical insurance if this type of visit is covered. You will be responsible for any charges that are not paid by your insurance.      E-visits via [employer]:  generally incur a $35.00 fee.  Telephone visits:  Time spent on the phone: *charged based on time that is spent on the phone in increments of 10 minutes. Estimated cost:   5-10 mins $30.00   11-20 mins. $59.00   21-30 mins. $85.00     Use FinalCADt (secure email communication and access to your chart) to send your primary care provider a message or make an appointment. Ask someone on your Team how to sign up for [employer].  For a Price Quote for your services, please call our Consumer Price Line at [phone].  As always, Thank you for trusting us with your health care needs!    La Hernandez MA

## 2017-07-07 NOTE — PROGRESS NOTES
"   SUBJECTIVE:   CC: Mary Grace Tompkins is an 62 year old woman who presents for preventive health visit.     Healthy Habits:    Do you get at least three servings of calcium containing foods daily (dairy, green leafy vegetables, etc.)? {YES/NO, DAIRY INTAKE:037486::\"yes\"}    Amount of exercise or daily activities, outside of work: {AMOUNT EXERCISE:977143}    Problems taking medications regularly {Yes /No default:768365::\"No\"}    Medication side effects: {Yes /No default.:718467::\"No\"}    Have you had an eye exam in the past two years? {YESNOBLANK:171452}    Do you see a dentist twice per year? {YESNOBLANK:849900}    Do you have sleep apnea, excessive snoring or daytime drowsiness?{YESNOBLANK:765326}    {Outside tests to abstract? :942136}    {additional problems to add (Optional):644455}    Today's PHQ-2 Score:   PHQ-2 ( 1999 [employer]) 8/3/2016 2/10/2016   Q1: Little interest or pleasure in doing things 1 0   Q2: Feeling down, depressed or hopeless 1 0   PHQ-2 Score 2 0     {PHQ-2 LOOK IN ASSESSMENTS (Optional) :954630}  Abuse: Current or Past(Physical, Sexual or Emotional)- {YES/NO/NA:132327}  Do you feel safe in your environment - {YES/NO/NA:032447}    Social History   Substance Use Topics     Smoking status: Former Smoker     Packs/day: 1.50     Types: Cigarettes     Quit date: 1/1/1990     Smokeless tobacco: Never Used     Alcohol use No      Comment: Ocassionally     {ETOH AUDIT:116957}    Reviewed orders with patient.  Reviewed health maintenance and updated orders accordingly - {Yes/No:768571::\"Yes\"}  {Chronicprobdata (Optional):157116}    {Mammo Decision Support (Optional):737567}    Pertinent mammograms are reviewed under the imaging tab.  History of abnormal Pap smear: {PAP HX:179370}    Reviewed and updated as needed this visit by clinical staff         Reviewed and updated as needed this visit by Provider        {HISTORY OPTIONS (Optional):493355}    ROS:  {FEMALE PREVENTATIVE ROS:916379}    OBJECTIVE:   There " "were no vitals taken for this visit.  EXAM:  {Exam Choices:909671}    ASSESSMENT/PLAN:   {Diag Picklist:734539}    COUNSELING:   {FEMALE COUNSELING MESSAGES:604568::\"Reviewed preventive health counseling, as reflected in patient instructions\"}    {BP Counseling- Complete if BP >= 120/80  (Optional):259377}     reports that she quit smoking about 27 years ago. Her smoking use included Cigarettes. She smoked 1.50 packs per day. She has never used smokeless tobacco.  {Tobacco Cessation -- Complete if patient is a smoker (Optional):182024}  Estimated body mass index is 29.68 kg/(m^2) as calculated from the following:    Height as of 3/27/17: 5' 8.23\" (1.733 m).    Weight as of 3/27/17: 196 lb 8 oz (89.1 kg).   {Weight Management Plan (ACO) Complete if BMI is abnormal-  Ages 18-64  BMI >24.9.  Age 65+ with BMI <23 or >30 (Optional):159312}    Counseling Resources:  ATP IV Guidelines  Pooled Cohorts Equation Calculator  Breast Cancer Risk Calculator  FRAX Risk Assessment  ICSI Preventive Guidelines  Dietary Guidelines for Americans, 2010  USDA's MyPlate  ASA Prophylaxis  Lung CA Screening    Mary Carmen Rdz MD  [facility]  "

## 2017-07-19 DIAGNOSIS — E53.8 VITAMIN B 12 DEFICIENCY: ICD-10-CM

## 2017-07-19 DIAGNOSIS — E78.5 HYPERLIPIDEMIA LDL GOAL <130: ICD-10-CM

## 2017-07-19 DIAGNOSIS — E03.8 OTHER SPECIFIED HYPOTHYROIDISM: ICD-10-CM

## 2017-07-19 DIAGNOSIS — I10 ESSENTIAL HYPERTENSION WITH GOAL BLOOD PRESSURE LESS THAN 140/90: ICD-10-CM

## 2017-07-19 DIAGNOSIS — R31.29 MICROHEMATURIA: ICD-10-CM

## 2017-07-19 LAB
ALBUMIN UR-MCNC: NEGATIVE MG/DL
ANION GAP SERPL CALCULATED.3IONS-SCNC: 7 MMOL/L (ref 3–14)
APPEARANCE UR: CLEAR
BILIRUB UR QL STRIP: NEGATIVE
BUN SERPL-MCNC: 20 MG/DL (ref 7–30)
CALCIUM SERPL-MCNC: 9.3 MG/DL (ref 8.5–10.1)
CHLORIDE SERPL-SCNC: 109 MMOL/L (ref 94–109)
CHOLEST SERPL-MCNC: 234 MG/DL
CO2 SERPL-SCNC: 25 MMOL/L (ref 20–32)
COLOR UR AUTO: YELLOW
CREAT SERPL-MCNC: 1.19 MG/DL (ref 0.52–1.04)
GFR SERPL CREATININE-BSD FRML MDRD: 46 ML/MIN/1.7M2
GLUCOSE SERPL-MCNC: 89 MG/DL (ref 70–99)
GLUCOSE UR STRIP-MCNC: NEGATIVE MG/DL
HDLC SERPL-MCNC: 96 MG/DL
HGB UR QL STRIP: NEGATIVE
KETONES UR STRIP-MCNC: NEGATIVE MG/DL
LDLC SERPL CALC-MCNC: 124 MG/DL
LEUKOCYTE ESTERASE UR QL STRIP: ABNORMAL
NITRATE UR QL: NEGATIVE
NONHDLC SERPL-MCNC: 138 MG/DL
PH UR STRIP: 6 PH (ref 5–7)
POTASSIUM SERPL-SCNC: 3.9 MMOL/L (ref 3.4–5.3)
RBC #/AREA URNS AUTO: ABNORMAL /HPF (ref 0–2)
SODIUM SERPL-SCNC: 141 MMOL/L (ref 133–144)
SP GR UR STRIP: 1.01 (ref 1–1.03)
TRIGL SERPL-MCNC: 69 MG/DL
TSH SERPL DL<=0.005 MIU/L-ACNC: 0.8 MU/L (ref 0.4–4)
URN SPEC COLLECT METH UR: ABNORMAL
UROBILINOGEN UR STRIP-ACNC: 0.2 EU/DL (ref 0.2–1)
VIT B12 SERPL-MCNC: 595 PG/ML (ref 193–986)
WBC #/AREA URNS AUTO: ABNORMAL /HPF (ref 0–2)

## 2017-07-19 PROCEDURE — 84443 ASSAY THYROID STIM HORMONE: CPT | Performed by: FAMILY MEDICINE

## 2017-07-19 PROCEDURE — 80048 BASIC METABOLIC PNL TOTAL CA: CPT | Performed by: FAMILY MEDICINE

## 2017-07-19 PROCEDURE — 36415 COLL VENOUS BLD VENIPUNCTURE: CPT | Performed by: FAMILY MEDICINE

## 2017-07-19 PROCEDURE — 81001 URINALYSIS AUTO W/SCOPE: CPT | Performed by: FAMILY MEDICINE

## 2017-07-19 PROCEDURE — 82607 VITAMIN B-12: CPT | Performed by: FAMILY MEDICINE

## 2017-07-19 PROCEDURE — 80061 LIPID PANEL: CPT | Performed by: FAMILY MEDICINE

## 2017-07-26 ENCOUNTER — OFFICE VISIT (OUTPATIENT)
Dept: FAMILY MEDICINE | Facility: CLINIC | Age: 62
End: 2017-07-26
Payer: COMMERCIAL

## 2017-07-26 ENCOUNTER — TELEPHONE (OUTPATIENT)
Dept: FAMILY MEDICINE | Facility: CLINIC | Age: 62
End: 2017-07-26

## 2017-07-26 ENCOUNTER — RADIANT APPOINTMENT (OUTPATIENT)
Dept: MAMMOGRAPHY | Facility: CLINIC | Age: 62
End: 2017-07-26
Attending: FAMILY MEDICINE
Payer: COMMERCIAL

## 2017-07-26 VITALS
WEIGHT: 202.5 LBS | BODY MASS INDEX: 29.99 KG/M2 | OXYGEN SATURATION: 97 % | HEIGHT: 69 IN | SYSTOLIC BLOOD PRESSURE: 118 MMHG | HEART RATE: 98 BPM | DIASTOLIC BLOOD PRESSURE: 74 MMHG | TEMPERATURE: 98.9 F

## 2017-07-26 DIAGNOSIS — G43.809 OTHER MIGRAINE WITHOUT STATUS MIGRAINOSUS, NOT INTRACTABLE: ICD-10-CM

## 2017-07-26 DIAGNOSIS — Z12.11 SCREEN FOR COLON CANCER: ICD-10-CM

## 2017-07-26 DIAGNOSIS — F51.04 PSYCHOPHYSIOLOGIC INSOMNIA: ICD-10-CM

## 2017-07-26 DIAGNOSIS — K90.0 CELIAC DISEASE: ICD-10-CM

## 2017-07-26 DIAGNOSIS — K21.9 GASTROESOPHAGEAL REFLUX DISEASE WITHOUT ESOPHAGITIS: ICD-10-CM

## 2017-07-26 DIAGNOSIS — E78.5 HYPERLIPIDEMIA LDL GOAL <130: ICD-10-CM

## 2017-07-26 DIAGNOSIS — R53.82 CHRONIC FATIGUE: ICD-10-CM

## 2017-07-26 DIAGNOSIS — G43.709 CHRONIC MIGRAINE WITHOUT AURA WITHOUT STATUS MIGRAINOSUS, NOT INTRACTABLE: ICD-10-CM

## 2017-07-26 DIAGNOSIS — I10 ESSENTIAL HYPERTENSION WITH GOAL BLOOD PRESSURE LESS THAN 140/90: ICD-10-CM

## 2017-07-26 DIAGNOSIS — G47.33 OBSTRUCTIVE SLEEP APNEA: ICD-10-CM

## 2017-07-26 DIAGNOSIS — Z12.31 VISIT FOR SCREENING MAMMOGRAM: ICD-10-CM

## 2017-07-26 DIAGNOSIS — E03.8 OTHER SPECIFIED HYPOTHYROIDISM: ICD-10-CM

## 2017-07-26 DIAGNOSIS — F33.1 MAJOR DEPRESSIVE DISORDER, RECURRENT EPISODE, MODERATE (H): ICD-10-CM

## 2017-07-26 DIAGNOSIS — R13.10 DYSPHAGIA, UNSPECIFIED TYPE: ICD-10-CM

## 2017-07-26 DIAGNOSIS — L65.9 HAIR LOSS: ICD-10-CM

## 2017-07-26 DIAGNOSIS — Z00.01 ENCOUNTER FOR ROUTINE ADULT HEALTH EXAMINATION WITH ABNORMAL FINDINGS: Primary | ICD-10-CM

## 2017-07-26 LAB
ALBUMIN SERPL-MCNC: 3.5 G/DL (ref 3.4–5)
ALP SERPL-CCNC: 94 U/L (ref 40–150)
ALT SERPL W P-5'-P-CCNC: 39 U/L (ref 0–50)
ANION GAP SERPL CALCULATED.3IONS-SCNC: 8 MMOL/L (ref 3–14)
AST SERPL W P-5'-P-CCNC: 30 U/L (ref 0–45)
BILIRUB SERPL-MCNC: 0.3 MG/DL (ref 0.2–1.3)
BUN SERPL-MCNC: 17 MG/DL (ref 7–30)
CALCIUM SERPL-MCNC: 9.2 MG/DL (ref 8.5–10.1)
CHLORIDE SERPL-SCNC: 106 MMOL/L (ref 94–109)
CO2 SERPL-SCNC: 24 MMOL/L (ref 20–32)
CREAT SERPL-MCNC: 0.95 MG/DL (ref 0.52–1.04)
FERRITIN SERPL-MCNC: 21 NG/ML (ref 8–252)
GFR SERPL CREATININE-BSD FRML MDRD: 60 ML/MIN/1.7M2
GLUCOSE SERPL-MCNC: 84 MG/DL (ref 70–99)
POTASSIUM SERPL-SCNC: 3.8 MMOL/L (ref 3.4–5.3)
PROT SERPL-MCNC: 8 G/DL (ref 6.8–8.8)
SODIUM SERPL-SCNC: 138 MMOL/L (ref 133–144)

## 2017-07-26 PROCEDURE — 77063 BREAST TOMOSYNTHESIS BI: CPT | Mod: TC

## 2017-07-26 PROCEDURE — 36415 COLL VENOUS BLD VENIPUNCTURE: CPT | Performed by: FAMILY MEDICINE

## 2017-07-26 PROCEDURE — 99396 PREV VISIT EST AGE 40-64: CPT | Performed by: FAMILY MEDICINE

## 2017-07-26 PROCEDURE — 99213 OFFICE O/P EST LOW 20 MIN: CPT | Mod: 25 | Performed by: FAMILY MEDICINE

## 2017-07-26 PROCEDURE — G0202 SCR MAMMO BI INCL CAD: HCPCS | Mod: TC

## 2017-07-26 PROCEDURE — 83516 IMMUNOASSAY NONANTIBODY: CPT | Performed by: FAMILY MEDICINE

## 2017-07-26 PROCEDURE — 82728 ASSAY OF FERRITIN: CPT | Performed by: FAMILY MEDICINE

## 2017-07-26 PROCEDURE — 83516 IMMUNOASSAY NONANTIBODY: CPT | Mod: 59 | Performed by: FAMILY MEDICINE

## 2017-07-26 PROCEDURE — 80053 COMPREHEN METABOLIC PANEL: CPT | Performed by: FAMILY MEDICINE

## 2017-07-26 RX ORDER — LISINOPRIL 5 MG/1
5 TABLET ORAL DAILY
Qty: 30 TABLET | Refills: 0 | Status: SHIPPED | OUTPATIENT
Start: 2017-07-26 | End: 2017-08-14

## 2017-07-26 RX ORDER — LANSOPRAZOLE 30 MG/1
30 CAPSULE, DELAYED RELEASE ORAL DAILY
Qty: 30 CAPSULE | Refills: 0 | Status: SHIPPED | OUTPATIENT
Start: 2017-07-26 | End: 2017-08-14

## 2017-07-26 RX ORDER — AMLODIPINE BESYLATE 10 MG/1
10 TABLET ORAL DAILY
Qty: 30 TABLET | Refills: 0 | Status: SHIPPED | OUTPATIENT
Start: 2017-07-26 | End: 2017-08-14

## 2017-07-26 RX ORDER — TRAZODONE HYDROCHLORIDE 50 MG/1
50 TABLET, FILM COATED ORAL AT BEDTIME
Qty: 30 TABLET | Refills: 0 | Status: SHIPPED | OUTPATIENT
Start: 2017-07-26 | End: 2017-08-14

## 2017-07-26 RX ORDER — VENLAFAXINE HYDROCHLORIDE 75 MG/1
CAPSULE, EXTENDED RELEASE ORAL
Qty: 90 CAPSULE | Refills: 0 | Status: SHIPPED | OUTPATIENT
Start: 2017-07-26 | End: 2017-08-14

## 2017-07-26 RX ORDER — ATORVASTATIN CALCIUM 20 MG/1
20 TABLET, FILM COATED ORAL DAILY
Qty: 30 TABLET | Refills: 0 | Status: SHIPPED | OUTPATIENT
Start: 2017-07-26 | End: 2017-08-14

## 2017-07-26 RX ORDER — LOVASTATIN 10 MG
10 TABLET ORAL AT BEDTIME
Qty: 90 TABLET | Refills: 0 | Status: CANCELLED | OUTPATIENT
Start: 2017-07-26

## 2017-07-26 RX ORDER — LEVOTHYROXINE SODIUM 137 UG/1
137 TABLET ORAL DAILY
Qty: 30 TABLET | Refills: 0 | Status: SHIPPED | OUTPATIENT
Start: 2017-07-26 | End: 2017-08-14

## 2017-07-26 RX ORDER — ARIPIPRAZOLE 2 MG/1
TABLET ORAL
Qty: 15 TABLET | Refills: 0 | Status: SHIPPED | OUTPATIENT
Start: 2017-07-26 | End: 2017-08-14

## 2017-07-26 RX ORDER — TOPIRAMATE 100 MG/1
100 TABLET, FILM COATED ORAL 2 TIMES DAILY
Qty: 60 TABLET | Refills: 0 | Status: SHIPPED | OUTPATIENT
Start: 2017-07-26 | End: 2017-08-14

## 2017-07-26 ASSESSMENT — PAIN SCALES - GENERAL: PAINLEVEL: NO PAIN (0)

## 2017-07-26 NOTE — MR AVS SNAPSHOT
After Visit Summary   7/26/2017    Mary Grace Tompkins    MRN: [id number]           Patient Information     Date Of Birth          1955        Visit Information        Provider Department      7/26/2017 1:30 PM [facility]        Today's Diagnoses     Obstructive sleep apnea    -  1    Screen for colon cancer        Visit for screening mammogram        Essential hypertension with goal blood pressure less than 140/90        Hyperlipidemia LDL goal <130        Major depressive disorder, recurrent episode, moderate (H)        Chronic migraine without aura without status migrainosus, not intractable        Psychophysiologic insomnia        Other specified hypothyroidism        Gastroesophageal reflux disease without esophagitis        Celiac disease        Microhematuria        Other migraine without status migrainosus, not intractable        Hair loss        Dysphagia, unspecified type        Chronic fatigue          Care Instructions      Preventive Health Recommendations  Female Ages 50 - 64    Yearly exam: See your health care provider every year in order to  o Review health changes.   o Discuss preventive care.    o Review your medicines if your doctor has prescribed any.      Get a Pap test every three years (unless you have an abnormal result and your provider advises testing more often).    If you get Pap tests with HPV test, you only need to test every 5 years, unless you have an abnormal result.     You do not need a Pap test if your uterus was removed (hysterectomy) and you have not had cancer.    You should be tested each year for STDs (sexually transmitted diseases) if you're at risk.     Have a mammogram every 1 to 2 years.    Have a colonoscopy at age 50, or have a yearly FIT test (stool test). These exams screen for colon cancer.      Have a cholesterol test every 5 years, or more often if advised.    Have a diabetes test (fasting glucose) every three  years. If you are at risk for diabetes, you should have this test more often.     If you are at risk for osteoporosis (brittle bone disease), think about having a bone density scan (DEXA).    Shots: Get a flu shot each year. Get a tetanus shot every 10 years.    Nutrition:     Eat at least 5 servings of fruits and vegetables each day.    Eat whole-grain bread, whole-wheat pasta and brown rice instead of white grains and rice.    Talk to your provider about Calcium and Vitamin D.     Lifestyle    Exercise at least 150 minutes a week (30 minutes a day, 5 days a week). This will help you control your weight and prevent disease.    Limit alcohol to one drink per day.    No smoking.     Wear sunscreen to prevent skin cancer.     See your dentist every six months for an exam and cleaning.    See your eye doctor every 1 to 2 years.      Call to schedule the colonoscopy/upper GI test and to see the sleep doctor  We'll check labs today to see what may be causing fatigue  [facility]    If you have any questions regarding to your visit please contact your care team:       Team Purple:   Clinic Hours Telephone Number   Dr. Viridiana Rdz     7am-7pm  Monday - Thursday   7am-5pm  Fridays  [phone]  (Appointment scheduling available 24/7)    Questions about your Visit?   Team Line:  [phone]   Urgent Care - [place] - 11am-9pm Monday-Friday Saturday-Sunday- 9am-5pm   [place] - 5pm-9pm Monday-Friday Saturday-Sunday- 9am-5pm  [phone] - [place]   [phone] [facility]       What options do I have for visits at the clinic other than the traditional office visit?  To expand how we care for you, many of our providers are utilizing electronic visits (e-visits) and telephone visits, when medically appropriate, for interactions with their patients rather than a visit in the clinic.   We also offer nurse visits for many medical  concerns. Just like any other service, we will bill your insurance company for this type of visit based on time spent on the phone with your provider. Not all insurance companies cover these visits. Please check with your medical insurance if this type of visit is covered. You will be responsible for any charges that are not paid by your insurance.      E-visits via Loaded Pockethart:  generally incur a $35.00 fee.  Telephone visits:  Time spent on the phone: *charged based on time that is spent on the phone in increments of 10 minutes. Estimated cost:   5-10 mins $30.00   11-20 mins. $59.00   21-30 mins. $85.00     Use Unitask (secure email communication and access to your chart) to send your primary care provider a message or make an appointment. Ask someone on your Team how to sign up for Unitask.  For a Price Quote for your services, please call our [employer]find Line at [phone].  As always, Thank you for trusting us with your health care needs!    La Hernandez MA            Follow-ups after your visit        Additional Services     GASTROENTEROLOGY ADULT REF PROCEDURE ONLY       Last Lab Result: Creatinine (mg/dL)       Date                     Value                 07/19/2017               1.19 (H)         ----------  Body mass index is 29.99 kg/(m^2).     Needed:  No  Language:  English    Patient will be contacted to schedule procedure.     Please be aware that coverage of these services is subject to the terms and limitations of your health insurance plan.  Call member services at your health plan with any benefit or coverage questions.  Any procedures must be performed at a [place] facility OR coordinated by your clinic's referral office.    Please bring the following with you to your appointment:    (1) Any X-Rays, CTs or MRIs which have been performed.  Contact the facility where they were done to arrange for  prior to your scheduled appointment.    (2) List of current medications   (3)  This referral request   (4) Any documents/labs given to you for this referral            SLEEP EVALUATION & MANAGEMENT REFERRAL - ADULT       Please be aware that coverage of these services is subject to the terms and limitations of your health insurance plan.  Call member services at your health plan with any benefit or coverage questions.      Please bring the following to your appointment:    >>   List of current medications   >>   This referral request   >>   Any documents/labs given to you for this referral    [facility] [phone] (Age 15 and up)                  Future tests that were ordered for you today     Open Future Orders        Priority Expected Expires Ordered    MA SCREENING DIGITAL BILAT - Future  (s+30) Routine  7/7/2018 7/26/2017    SLEEP EVALUATION & MANAGEMENT REFERRAL - ADULT Routine  7/26/2018 7/26/2017            Who to contact     If you have questions or need follow up information about today's clinic visit or your schedule please contact [facility] directly at [phone].  Normal or non-critical lab and imaging results will be communicated to you by Appvancehart, letter or phone within 4 business days after the clinic has received the results. If you do not hear from us within 7 days, please contact the clinic through Appvancehart or phone. If you have a critical or abnormal lab result, we will notify you by phone as soon as possible.  Submit refill requests through CorkCRM or call your pharmacy and they will forward the refill request to us. Please allow 3 business days for your refill to be completed.          Additional Information About Your Visit        CorkCRM Information     CorkCRM gives you secure access to your electronic health record. If you see a primary care provider, you can also send messages to your care team and make appointments. If you have questions, please call your primary care clinic.  If you do not have a primary care  "provider, please call [phone] and they will assist you.        Care EveryWhere ID     This is your Care EveryWhere ID. This could be used by other organizations to access your [facility] medical records  [id number]        Your Vitals Were     Pulse Temperature Height Pulse Oximetry BMI (Body Mass Index)       98 98.9  F (37.2  C) (Oral) 5' 8.9\" (1.75 m) 97% 29.99 kg/m2        Blood Pressure from Last 3 Encounters:   07/26/17 118/74   03/27/17 118/74   03/16/17 120/80    Weight from Last 3 Encounters:   07/26/17 202 lb 8 oz (91.9 kg)   03/27/17 196 lb 8 oz (89.1 kg)   03/16/17 200 lb (90.7 kg)              We Performed the Following     Comprehensive metabolic panel (BMP + Alb, Alk Phos, ALT, AST, Total. Bili, TP)     Ferritin     GASTROENTEROLOGY ADULT REF PROCEDURE ONLY     Tissue transglutaminase mai IgA and IgG          Today's Medication Changes          These changes are accurate as of: 7/26/17  2:27 PM.  If you have any questions, ask your nurse or doctor.               Start taking these medicines.        Dose/Directions    atorvastatin 20 MG tablet   Commonly known as:  LIPITOR   Used for:  Hyperlipidemia LDL goal <130   Started by:  Mary Carmen Rdz MD        Dose:  20 mg   Take 1 tablet (20 mg) by mouth daily   Quantity:  30 tablet   Refills:  0            Where to get your medicines      These medications were sent to [place] [address] [place] [address] [address]     Phone:  [phone]     amLODIPine 10 MG tablet    ARIPiprazole 2 MG tablet    atorvastatin 20 MG tablet    LANsoprazole 30 MG CR capsule    levothyroxine 137 MCG tablet    lisinopril 5 MG tablet    topiramate 100 MG tablet    traZODone 50 MG tablet    venlafaxine 75 MG 24 hr capsule                Primary Care Provider Office Phone # Fax #    Mary Carmen Rdz [phone]       [address] [address]        Equal Access " to Services     NENITA [place] : Evan Grant, waayadda mone, qaybta kaalmaruyb vanessa. So [facility] [phone].    ATENCIÓN: Si ty barbour, tiene a jain disposición servicios gratuitos de asistencia lingüística. Llame al [phone].    We comply with applicable federal civil rights laws and [place] laws. We do not discriminate on the basis of race, color, national origin, age, disability sex, sexual orientation or gender identity.            Thank you!     Thank you for choosing [facility]  for your care. Our goal is always to provide you with excellent care. Hearing back from our patients is one way we can continue to improve our services. Please take a few minutes to complete the written survey that you may receive in the mail after your visit with us. Thank you!             Your Updated Medication List - Protect others around you: Learn how to safely use, store and throw away your medicines at www.disposemymeds.org.          This list is accurate as of: 7/26/17  2:27 PM.  Always use your most recent med list.                   Brand Name Dispense Instructions for use Diagnosis    albuterol 108 (90 BASE) MCG/ACT Inhaler    PROAIR HFA/PROVENTIL HFA/VENTOLIN HFA    1 Inhaler    Inhale 2 puffs into the lungs every 4 hours as needed for shortness of breath / dyspnea or wheezing        amLODIPine 10 MG tablet    NORVASC    30 tablet    Take 1 tablet (10 mg) by mouth daily    Essential hypertension with goal blood pressure less than 140/90       ARIPiprazole 2 MG tablet    ABILIFY    15 tablet    Take 1/2 pill at bedtime    Major depressive disorder, recurrent episode, moderate (H)       aspirin 81 MG tablet      Take 1 tablet by mouth daily.        atorvastatin 20 MG tablet    LIPITOR    30 tablet    Take 1 tablet (20 mg) by mouth daily    Hyperlipidemia LDL goal <130       cholecalciferol 1000 UNIT tablet    vitamin D     Take 1 tablet by  mouth daily.        LANsoprazole 30 MG CR capsule    PREVACID    30 capsule    Take 1 capsule (30 mg) by mouth daily    Gastroesophageal reflux disease without esophagitis       levothyroxine 137 MCG tablet    SYNTHROID/LEVOTHROID    30 tablet    Take 1 tablet (137 mcg) by mouth daily ELENO.    Other specified hypothyroidism       lisinopril 5 MG tablet    PRINIVIL/ZESTRIL    30 tablet    Take 1 tablet (5 mg) by mouth daily    Essential hypertension with goal blood pressure less than 140/90       lovastatin 10 MG tablet    MEVACOR    90 tablet    Take 1 tablet (10 mg) by mouth At Bedtime    Hyperlipidemia LDL goal <130       MULTIVITAMIN PO      Take  by mouth.        order for DME      Equipment being ordered: CPAP AIRSENSE 10 WISP LARGE 5-15 CM H2O # [id number]  DN# [id number]        order for DME     1 each    Equipment being ordered: BP monitor    Hypertension goal BP (blood pressure) < 140/90       topiramate 100 MG tablet    TOPAMAX    60 tablet    Take 1 tablet (100 mg) by mouth 2 times daily    Chronic migraine without aura without status migrainosus, not intractable       traZODone 50 MG tablet    DESYREL    30 tablet    Take 1 tablet (50 mg) by mouth At Bedtime    Psychophysiologic insomnia       venlafaxine 75 MG 24 hr capsule    EFFEXOR-XR    90 capsule    TAKE 3 CAPSULES (225MG)    DAILY    Major depressive disorder, recurrent episode, moderate (H)       VITAMIN B-12 SL      Place 1 tablet under the tongue daily.        zinc 50 MG Tabs      Take 1 tablet by mouth daily.

## 2017-07-26 NOTE — PROGRESS NOTES
"   SUBJECTIVE:   CC: Mary Grace Tompkins is an 62 year old woman who presents for preventive health visit.     Physical   Annual:     Getting at least 3 servings of Calcium per day::  NO    Bi-annual eye exam::  NO    Dental care twice a year::  Yes    Sleep apnea or symptoms of sleep apnea::  Excessive snoring and Sleep apnea    Diet::  Gluten-free/reduced and Breakfast skipped    Frequency of exercise::  None    Taking medications regularly::  Yes    Additional concerns today::  YES          Significant hair loss the last month, no scalp symptoms, diffuse  Chronic fatigue, knows she needs 9 hours of sleep per night, feels sleepy when she doesn't get this. Getting up at 4-4:30 am to have time by herself then in bed at 9p, asleep by 10. Has sleep apnea, wearing cpap. No recent follow up with sleep medicine. Has been an issue ongoing-- just doesn't have the energy to do crafts or other things she enjoys. Gets up early because she is an introvert, work requires her to be an extrovert and  is an extrovert, so feels exhausted by being around people.    Chronic depression: mood is ok most of the time, but does have episodes of \"out of the blue sadness\" that is intense and will make her cry. Is short lived, then goes away in an hour or so, happening the last 3 months    Has had trouble swallowing, pills and difficult to chew foods. History of previous esophageal dilitation by histor, years ago.     Today's PHQ-2 Score: PHQ-2 ( 1999 Pfizer) 7/26/2017   Q1: Little interest or pleasure in doing things 1   Q2: Feeling down, depressed or hopeless 1   PHQ-2 Score 2   Q1: Little interest or pleasure in doing things Several days   Q2: Feeling down, depressed or hopeless Several days   PHQ-2 Score 2       Abuse: Current or Past(Physical, Sexual or Emotional)- No  Do you feel safe in your environment - Yes    Social History   Substance Use Topics     Smoking status: Former Smoker     Packs/day: 1.50     Types: Cigarettes     Quit " date: 1/1/1990     Smokeless tobacco: Never Used     Alcohol use No      Comment: Ocassionally     The patient does not drink >3 drinks per day nor >7 drinks per week.    Reviewed orders with patient.  Reviewed health maintenance and updated orders accordingly - Yes  Labs reviewed in EPIC  BP Readings from Last 3 Encounters:   07/26/17 118/74   03/27/17 118/74   03/16/17 120/80    Wt Readings from Last 3 Encounters:   07/26/17 202 lb 8 oz (91.9 kg)   03/27/17 196 lb 8 oz (89.1 kg)   03/16/17 200 lb (90.7 kg)                  Patient Active Problem List   Diagnosis     Obstructive sleep apnea     Migraines     Hypothyroid     OA (osteoarthritis)     GERD (gastroesophageal reflux disease)     Vitamin B 12 deficiency     Celiac disease     Diagnostic skin and sensitization tests(aka ALLERGENS)     VMR (vasomotor rhinitis)     Chronic urticaria     Advanced directives, counseling/discussion     Hot flashes     Nonspecific abnormal electrocardiogram (ECG) (EKG)     Major depressive disorder, recurrent episode, moderate (H)     Hyperlipidemia LDL goal <130     Microhematuria     Essential hypertension with goal blood pressure less than 140/90     Past Surgical History:   Procedure Laterality Date     TUBAL LIGATION  1987       Social History   Substance Use Topics     Smoking status: Former Smoker     Packs/day: 1.50     Types: Cigarettes     Quit date: 1/1/1990     Smokeless tobacco: Never Used     Alcohol use No      Comment: Ocassionally     Family History   Problem Relation Age of Onset     Allergies Mother      Psychotic Disorder Mother      Blood Disease Father      auto immune pernicious anemia     DIABETES Father      Neurologic Disorder Father      migraine     Allergies Maternal Grandmother      DIABETES Paternal Grandmother      Alcohol/Drug Brother      Alcohol/Drug Daughter      Depression Daughter      suicide attempt     Thyroid Disease Brother      hyperthyroid     Respiratory Brother       "          Patient over age 50, mutual decision to screen reflected in health maintenance.      Pertinent mammograms are reviewed under the imaging tab.  History of abnormal Pap smear: NO - age 30-65 PAP every 5 years with negative HPV co-testing recommended    Reviewed and updated as needed this visit by clinical staffTobacco  Allergies  Meds         Reviewed and updated as needed this visit by Provider              ROS:  C: NEGATIVE for fever, chills, change in weight  I: NEGATIVE for worrisome rashes, moles or lesions  E: NEGATIVE for vision changes or irritation  ENT: NEGATIVE for ear, mouth and throat problems  R: NEGATIVE for significant cough or SOB  B: NEGATIVE for masses, tenderness or discharge  CV: NEGATIVE for chest pain, palpitations or peripheral edema  GI: as above   : NEGATIVE for unusual urinary or vaginal symptoms. No vaginal bleeding.  M: NEGATIVE for significant arthralgias or myalgia  N: NEGATIVE for weakness, dizziness or paresthesias  PSYCHIATRIC: as above       OBJECTIVE:   /74  Pulse 98  Temp 98.9  F (37.2  C) (Oral)  Ht 5' 8.9\" (1.75 m)  Wt 202 lb 8 oz (91.9 kg)  SpO2 97%  BMI 29.99 kg/m2  EXAM:  GENERAL: healthy, alert, no distress and over weight  EYES: Eyes grossly normal to inspection, PERRL and conjunctivae and sclerae normal  HENT: ear canals and TM's normal, nose and mouth without ulcers or lesions  NECK: no adenopathy, no asymmetry, masses, or scars and thyroid normal to palpation  RESP: lungs clear to auscultation - no rales, rhonchi or wheezes  BREAST: normal without masses, tenderness or nipple discharge and no palpable axillary masses or adenopathy  CV: regular rate and rhythm, normal S1 S2, no S3 or S4, no murmur, click or rub, no peripheral edema and peripheral pulses strong  ABDOMEN: soft, nontender, no hepatosplenomegaly, no masses and bowel sounds normal  MS: no gross musculoskeletal defects noted, no edema  SKIN: no suspicious lesions or rashes  NEURO: " Normal strength and tone, mentation intact and speech normal  PSYCH: mentation appears normal, affect normal/bright    ASSESSMENT/PLAN:       ICD-10-CM    1. Obstructive sleep apnea G47.33 SLEEP EVALUATION & MANAGEMENT REFERRAL - ADULT   2. Screen for colon cancer Z12.11 GASTROENTEROLOGY ADULT REF PROCEDURE ONLY   3. Visit for screening mammogram Z12.31 CANCELED: MA SCREENING DIGITAL BILAT - Future  (s+30)   4. Essential hypertension with goal blood pressure less than 140/90 I10 amLODIPine (NORVASC) 10 MG tablet     lisinopril (PRINIVIL/ZESTRIL) 5 MG tablet   5. Hyperlipidemia LDL goal <130 E78.5 atorvastatin (LIPITOR) 20 MG tablet   6. Major depressive disorder, recurrent episode, moderate (H) F33.1 ARIPiprazole (ABILIFY) 2 MG tablet     venlafaxine (EFFEXOR-XR) 75 MG 24 hr capsule   7. Chronic migraine without aura without status migrainosus, not intractable G43.709 topiramate (TOPAMAX) 100 MG tablet   8. Psychophysiologic insomnia F51.04 traZODone (DESYREL) 50 MG tablet   9. Other specified hypothyroidism E03.8 levothyroxine (SYNTHROID/LEVOTHROID) 137 MCG tablet   10. Gastroesophageal reflux disease without esophagitis K21.9 LANsoprazole (PREVACID) 30 MG CR capsule   11. Celiac disease K90.0 Tissue transglutaminase mai IgA and IgG   12. Microhematuria R31.29    13. Other migraine without status migrainosus, not intractable G43.809    14. Hair loss L65.9 Ferritin     Comprehensive metabolic panel (BMP + Alb, Alk Phos, ALT, AST, Total. Bili, TP)   15. Dysphagia, unspecified type R13.10 GASTROENTEROLOGY ADULT REF PROCEDURE ONLY   16. Chronic fatigue R53.82 Ferritin     Comprehensive metabolic panel (BMP + Alb, Alk Phos, ALT, AST, Total. Bili, TP)     Chronic fatigue: has TRISH, chronic insomnia, and may not be getting enough sleep. Plan to check labs, have her see sleep medicine with machine to make sure this is optimized. Work on getting good sleep (enough hours), then follow up for next steps. May need eval for  "CFS.  Hair loss: often no underlying medical issue precipitates this. Plan to check ferritin today. Did have influenza A and B in spring-- possible telogen effluvium.  Dysphagia: given that she has GERD, plan UGI endoscopy for assessment. Should have at same time as colonoscopy for cancer screening  Blood pressure: controlled. Check labs and continue current medication  Lipids: controlled. Check labs and continue current medication  MDD: controlled somewhat on current medication, but may not be optimized. Discussed psychiatry and/or therapy referrals. Pt will continue current  Migraines: stable.  Insomnia and TRISH-- as above  History of celiac disease by history: check TTG levels-- could cause fatigue if this was not ideally controlled.     COUNSELING:  Reviewed preventive health counseling, as reflected in patient instructions       Regular exercise       Healthy diet/nutrition       Colon cancer screening       Advance Care Planning         reports that she quit smoking about 27 years ago. Her smoking use included Cigarettes. She smoked 1.50 packs per day. She has never used smokeless tobacco.    Estimated body mass index is 29.99 kg/(m^2) as calculated from the following:    Height as of this encounter: 5' 8.9\" (1.75 m).    Weight as of this encounter: 202 lb 8 oz (91.9 kg).   Weight management plan: Discussed healthy diet and exercise guidelines and patient will follow up in 12 months in clinic to re-evaluate.    Counseling Resources:  ATP IV Guidelines  Pooled Cohorts Equation Calculator  Breast Cancer Risk Calculator  FRAX Risk Assessment  ICSI Preventive Guidelines  Dietary Guidelines for Americans, 2010  USDA's MyPlate  ASA Prophylaxis  Lung CA Screening    Mary Carmen Rdz MD  [facility]  "

## 2017-07-26 NOTE — NURSING NOTE
"Chief Complaint   Patient presents with     Physical     Health Maintenance     colon cancer screening, Mammo, lipid, BMP, TSH       Initial /74  Pulse 98  Temp 98.9  F (37.2  C) (Oral)  Ht 5' 8.9\" (1.75 m)  Wt 202 lb 8 oz (91.9 kg)  SpO2 97%  BMI 29.99 kg/m2 Estimated body mass index is 29.99 kg/(m^2) as calculated from the following:    Height as of this encounter: 5' 8.9\" (1.75 m).    Weight as of this encounter: 202 lb 8 oz (91.9 kg).    Medication Reconciliation: complete    "

## 2017-07-26 NOTE — TELEPHONE ENCOUNTER
"Reason for Call:  Other prescription    Detailed comments:  [facility] pharmacy calling. A rx of levothyroxine was sent to pharmacy. It states \"ELENO\" on the rx. What is needed, levothyroxine or synthroid? Please call and let know.     Phone Number Patient can be reached at: Other phone number:   Number above.     Best Time:  Any    Can we leave a detailed message on this number? YES    Call taken on 7/26/2017 at 2:48 PM by Lorrie Vaz      "

## 2017-07-27 ASSESSMENT — ANXIETY QUESTIONNAIRES
5. BEING SO RESTLESS THAT IT IS HARD TO SIT STILL: SEVERAL DAYS
GAD7 TOTAL SCORE: 8
3. WORRYING TOO MUCH ABOUT DIFFERENT THINGS: SEVERAL DAYS
2. NOT BEING ABLE TO STOP OR CONTROL WORRYING: SEVERAL DAYS
7. FEELING AFRAID AS IF SOMETHING AWFUL MIGHT HAPPEN: SEVERAL DAYS
6. BECOMING EASILY ANNOYED OR IRRITABLE: MORE THAN HALF THE DAYS
IF YOU CHECKED OFF ANY PROBLEMS ON THIS QUESTIONNAIRE, HOW DIFFICULT HAVE THESE PROBLEMS MADE IT FOR YOU TO DO YOUR WORK, TAKE CARE OF THINGS AT HOME, OR GET ALONG WITH OTHER PEOPLE: SOMEWHAT DIFFICULT
1. FEELING NERVOUS, ANXIOUS, OR ON EDGE: SEVERAL DAYS

## 2017-07-27 ASSESSMENT — PATIENT HEALTH QUESTIONNAIRE - PHQ9: 5. POOR APPETITE OR OVEREATING: SEVERAL DAYS

## 2017-07-28 LAB
TTG IGA SER-ACNC: 1 U/ML
TTG IGG SER-ACNC: NORMAL U/ML

## 2017-07-28 ASSESSMENT — ANXIETY QUESTIONNAIRES: GAD7 TOTAL SCORE: 8

## 2017-07-28 ASSESSMENT — PATIENT HEALTH QUESTIONNAIRE - PHQ9: SUM OF ALL RESPONSES TO PHQ QUESTIONS 1-9: 10

## 2017-08-14 DIAGNOSIS — E03.8 OTHER SPECIFIED HYPOTHYROIDISM: ICD-10-CM

## 2017-08-14 DIAGNOSIS — G43.709 CHRONIC MIGRAINE WITHOUT AURA WITHOUT STATUS MIGRAINOSUS, NOT INTRACTABLE: ICD-10-CM

## 2017-08-14 DIAGNOSIS — I10 ESSENTIAL HYPERTENSION WITH GOAL BLOOD PRESSURE LESS THAN 140/90: ICD-10-CM

## 2017-08-14 DIAGNOSIS — F51.04 PSYCHOPHYSIOLOGIC INSOMNIA: ICD-10-CM

## 2017-08-14 DIAGNOSIS — K21.9 GASTROESOPHAGEAL REFLUX DISEASE WITHOUT ESOPHAGITIS: ICD-10-CM

## 2017-08-14 DIAGNOSIS — E78.5 HYPERLIPIDEMIA LDL GOAL <130: ICD-10-CM

## 2017-08-14 DIAGNOSIS — F33.1 MAJOR DEPRESSIVE DISORDER, RECURRENT EPISODE, MODERATE (H): ICD-10-CM

## 2017-08-14 NOTE — TELEPHONE ENCOUNTER
Reason for call:  Other   Patient called regarding (reason for call): prescription  Additional comments: Patient states [facility] has not received 90 supply for all medications that were originally sent to [facility] on 07/26/2017. Requesting all medications get sent as soon as poissible      Phone number to reach patient:  Cell number on file:    Telephone Information:   Mobile [phone]       Best Time:  anytime    Can we leave a detailed message on this number?  yes

## 2017-08-15 RX ORDER — VENLAFAXINE HYDROCHLORIDE 75 MG/1
CAPSULE, EXTENDED RELEASE ORAL
Qty: 90 CAPSULE | Refills: 0 | Status: SHIPPED | OUTPATIENT
Start: 2017-08-15 | End: 2017-09-12

## 2017-08-15 RX ORDER — TOPIRAMATE 100 MG/1
100 TABLET, FILM COATED ORAL 2 TIMES DAILY
Qty: 60 TABLET | Refills: 0 | Status: SHIPPED | OUTPATIENT
Start: 2017-08-15 | End: 2017-09-12

## 2017-08-15 RX ORDER — LEVOTHYROXINE SODIUM 137 UG/1
137 TABLET ORAL DAILY
Qty: 30 TABLET | Refills: 0 | Status: SHIPPED | OUTPATIENT
Start: 2017-08-15 | End: 2017-09-12

## 2017-08-15 RX ORDER — LISINOPRIL 5 MG/1
5 TABLET ORAL DAILY
Qty: 30 TABLET | Refills: 0 | Status: SHIPPED | OUTPATIENT
Start: 2017-08-15 | End: 2017-09-12

## 2017-08-15 RX ORDER — TRAZODONE HYDROCHLORIDE 50 MG/1
50 TABLET, FILM COATED ORAL AT BEDTIME
Qty: 30 TABLET | Refills: 0 | Status: SHIPPED | OUTPATIENT
Start: 2017-08-15 | End: 2017-09-12

## 2017-08-15 RX ORDER — ATORVASTATIN CALCIUM 20 MG/1
20 TABLET, FILM COATED ORAL DAILY
Qty: 30 TABLET | Refills: 0 | Status: SHIPPED | OUTPATIENT
Start: 2017-08-15 | End: 2017-09-12

## 2017-08-15 RX ORDER — LANSOPRAZOLE 30 MG/1
30 CAPSULE, DELAYED RELEASE ORAL DAILY
Qty: 30 CAPSULE | Refills: 0 | Status: SHIPPED | OUTPATIENT
Start: 2017-08-15 | End: 2017-09-12

## 2017-08-15 RX ORDER — ARIPIPRAZOLE 2 MG/1
TABLET ORAL
Qty: 15 TABLET | Refills: 0 | Status: SHIPPED | OUTPATIENT
Start: 2017-08-15 | End: 2017-09-12

## 2017-08-15 RX ORDER — AMLODIPINE BESYLATE 10 MG/1
10 TABLET ORAL DAILY
Qty: 30 TABLET | Refills: 0 | Status: SHIPPED | OUTPATIENT
Start: 2017-08-15 | End: 2017-09-12

## 2017-09-11 ENCOUNTER — TELEPHONE (OUTPATIENT)
Dept: FAMILY MEDICINE | Facility: CLINIC | Age: 62
End: 2017-09-11

## 2017-09-11 DIAGNOSIS — F51.04 PSYCHOPHYSIOLOGIC INSOMNIA: ICD-10-CM

## 2017-09-11 DIAGNOSIS — E78.5 HYPERLIPIDEMIA LDL GOAL <130: ICD-10-CM

## 2017-09-11 DIAGNOSIS — G43.709 CHRONIC MIGRAINE WITHOUT AURA WITHOUT STATUS MIGRAINOSUS, NOT INTRACTABLE: ICD-10-CM

## 2017-09-11 DIAGNOSIS — I10 ESSENTIAL HYPERTENSION WITH GOAL BLOOD PRESSURE LESS THAN 140/90: ICD-10-CM

## 2017-09-11 DIAGNOSIS — K21.9 GASTROESOPHAGEAL REFLUX DISEASE WITHOUT ESOPHAGITIS: ICD-10-CM

## 2017-09-11 DIAGNOSIS — E03.8 OTHER SPECIFIED HYPOTHYROIDISM: ICD-10-CM

## 2017-09-11 DIAGNOSIS — F33.1 MAJOR DEPRESSIVE DISORDER, RECURRENT EPISODE, MODERATE (H): ICD-10-CM

## 2017-09-11 NOTE — TELEPHONE ENCOUNTER
Pharmacy and patient requesting 90 day supply of all medication.  Can these be changed to 90 day supplies?  Patient was last seen on 7/26/17    Donna Garza RN

## 2017-09-11 NOTE — TELEPHONE ENCOUNTER
Reason for Call:  Other prescription    Detailed comments: Patient calling and stating all the prescriptions that were sent to the pharmacy on 08/15/17 need [facility] but for a 90 day supply instead. Patient would like a call back once this has been completed.    Phone Number Patient can be reached at: Home number on file [phone] (home)    Best Time: any time    Can we leave a detailed message on this number? YES    Call taken on 9/11/2017 at 4:06 PM by Tete Frazier

## 2017-09-12 RX ORDER — VENLAFAXINE HYDROCHLORIDE 75 MG/1
CAPSULE, EXTENDED RELEASE ORAL
Qty: 270 CAPSULE | Refills: 1 | Status: SHIPPED | OUTPATIENT
Start: 2017-09-12 | End: 2018-02-18

## 2017-09-12 RX ORDER — LISINOPRIL 5 MG/1
5 TABLET ORAL DAILY
Qty: 90 TABLET | Refills: 1 | Status: SHIPPED | OUTPATIENT
Start: 2017-09-12 | End: 2018-02-18

## 2017-09-12 RX ORDER — ARIPIPRAZOLE 2 MG/1
TABLET ORAL
Qty: 45 TABLET | Refills: 1 | Status: SHIPPED | OUTPATIENT
Start: 2017-09-12 | End: 2018-02-18

## 2017-09-12 RX ORDER — TRAZODONE HYDROCHLORIDE 50 MG/1
50 TABLET, FILM COATED ORAL AT BEDTIME
Qty: 90 TABLET | Refills: 1 | Status: SHIPPED | OUTPATIENT
Start: 2017-09-12 | End: 2018-02-18

## 2017-09-12 RX ORDER — TOPIRAMATE 100 MG/1
100 TABLET, FILM COATED ORAL 2 TIMES DAILY
Qty: 180 TABLET | Refills: 1 | Status: SHIPPED | OUTPATIENT
Start: 2017-09-12 | End: 2018-02-18

## 2017-09-12 RX ORDER — LANSOPRAZOLE 30 MG/1
30 CAPSULE, DELAYED RELEASE ORAL DAILY
Qty: 90 CAPSULE | Refills: 1 | Status: SHIPPED | OUTPATIENT
Start: 2017-09-12 | End: 2019-01-07

## 2017-09-12 RX ORDER — AMLODIPINE BESYLATE 10 MG/1
10 TABLET ORAL DAILY
Qty: 90 TABLET | Refills: 1 | Status: SHIPPED | OUTPATIENT
Start: 2017-09-12 | End: 2018-02-18

## 2017-09-12 RX ORDER — ATORVASTATIN CALCIUM 20 MG/1
20 TABLET, FILM COATED ORAL DAILY
Qty: 90 TABLET | Refills: 1 | Status: SHIPPED | OUTPATIENT
Start: 2017-09-12 | End: 2018-02-18

## 2017-09-12 RX ORDER — LEVOTHYROXINE SODIUM 137 UG/1
137 TABLET ORAL DAILY
Qty: 90 TABLET | Refills: 1 | Status: SHIPPED | OUTPATIENT
Start: 2017-09-12 | End: 2018-02-18

## 2017-09-12 NOTE — TELEPHONE ENCOUNTER
RN- -please pend prescriptions, OK for 90 d supply with 1 refill on all-- recommend follow up visit in 6 months, 30 min to recheck her chronic issues  Mary Carmen Rdz MD

## 2017-09-12 NOTE — TELEPHONE ENCOUNTER
Notified patient that prescription were sent to pharmacy with 90 day supply.  Patient verbalized understanding, no further questions or concerns.    Donna Garza RN

## 2017-09-12 NOTE — TELEPHONE ENCOUNTER
90 day supply with 1 refill sent on all medications, will notify patient later in morning.   Donna Garza RN

## 2017-11-14 ENCOUNTER — MYC MEDICAL ADVICE (OUTPATIENT)
Dept: FAMILY MEDICINE | Facility: CLINIC | Age: 62
End: 2017-11-14

## 2018-01-10 ASSESSMENT — PATIENT HEALTH QUESTIONNAIRE - PHQ9: SUM OF ALL RESPONSES TO PHQ QUESTIONS 1-9: 20

## 2018-01-10 NOTE — TELEPHONE ENCOUNTER
Called and spoke with patient. Completed PHQ9 over the phone.   PHQ-9 SCORE 1/10/2018   Total Score -   Total Score 20     Scheduled patient with Dr. Rdz on Monday, Jan 22,2018 @ 6:00 PM  Marleny Nichols MA

## 2018-02-18 ENCOUNTER — TELEPHONE (OUTPATIENT)
Dept: FAMILY MEDICINE | Facility: CLINIC | Age: 63
End: 2018-02-18

## 2018-02-18 DIAGNOSIS — I10 ESSENTIAL HYPERTENSION WITH GOAL BLOOD PRESSURE LESS THAN 140/90: ICD-10-CM

## 2018-02-18 DIAGNOSIS — F33.1 MAJOR DEPRESSIVE DISORDER, RECURRENT EPISODE, MODERATE (H): ICD-10-CM

## 2018-02-18 DIAGNOSIS — E03.8 OTHER SPECIFIED HYPOTHYROIDISM: ICD-10-CM

## 2018-02-18 DIAGNOSIS — G43.709 CHRONIC MIGRAINE WITHOUT AURA WITHOUT STATUS MIGRAINOSUS, NOT INTRACTABLE: ICD-10-CM

## 2018-02-18 DIAGNOSIS — E78.5 HYPERLIPIDEMIA LDL GOAL <130: ICD-10-CM

## 2018-02-18 DIAGNOSIS — F51.04 PSYCHOPHYSIOLOGIC INSOMNIA: ICD-10-CM

## 2018-02-19 RX ORDER — TRAZODONE HYDROCHLORIDE 50 MG/1
TABLET, FILM COATED ORAL
Qty: 90 TABLET | Refills: 1 | Status: SHIPPED | OUTPATIENT
Start: 2018-02-19 | End: 2018-08-20

## 2018-02-19 RX ORDER — TOPIRAMATE 100 MG/1
TABLET, FILM COATED ORAL
Qty: 180 TABLET | Refills: 1 | Status: SHIPPED | OUTPATIENT
Start: 2018-02-19 | End: 2018-08-20

## 2018-02-19 RX ORDER — ARIPIPRAZOLE 2 MG/1
TABLET ORAL
Qty: 45 TABLET | Refills: 1 | Status: SHIPPED | OUTPATIENT
Start: 2018-02-19 | End: 2018-08-20

## 2018-02-19 RX ORDER — LEVOTHYROXINE SODIUM 137 MCG
TABLET ORAL
Qty: 90 TABLET | Refills: 1 | Status: SHIPPED | OUTPATIENT
Start: 2018-02-19 | End: 2018-08-20

## 2018-02-19 RX ORDER — AMLODIPINE BESYLATE 10 MG/1
TABLET ORAL
Qty: 90 TABLET | Refills: 1 | Status: SHIPPED | OUTPATIENT
Start: 2018-02-19 | End: 2018-08-20

## 2018-02-19 RX ORDER — LISINOPRIL 5 MG/1
TABLET ORAL
Qty: 90 TABLET | Refills: 1 | Status: SHIPPED | OUTPATIENT
Start: 2018-02-19 | End: 2018-08-20

## 2018-02-19 RX ORDER — VENLAFAXINE HYDROCHLORIDE 75 MG/1
CAPSULE, EXTENDED RELEASE ORAL
Qty: 270 CAPSULE | Refills: 1 | Status: SHIPPED | OUTPATIENT
Start: 2018-02-19 | End: 2018-08-20

## 2018-02-19 RX ORDER — ATORVASTATIN CALCIUM 20 MG/1
TABLET, FILM COATED ORAL
Qty: 90 TABLET | Refills: 1 | Status: SHIPPED | OUTPATIENT
Start: 2018-02-19 | End: 2018-08-20

## 2018-02-19 NOTE — TELEPHONE ENCOUNTER
"Routing refill request to provider for review/approval because:  Failed [facility] refill protocol, see below:      Requested Prescriptions   Pending Prescriptions Disp Refills     ARIPiprazole (ABILIFY) 2 MG tablet [Pharmacy Med Name: ARIPIPRAZOLE TABS 2MG] 45 tablet 1     Sig: TAKE ONE-HALF (1/2) TABLET AT BEDTIME    Antipsychotic Medications Failed    2/19/2018  2:48 PM       Failed - CBC on file in past 12 months    Recent Labs   Lab Test  06/22/15   1530   WBC  9.2   RBC  4.51   HGB  13.9   HCT  42.7   PLT  330            Failed - Recent or future visit with authorizing provider's specialty    Patient had office visit in the last 6 months or has a visit in the next 30 days with authorizing provider.  See \"Patient Info\" tab in inbasket, or \"Choose Columns\" in Meds & Orders section of the refill encounter.           Passed - Blood pressure under 140/90 in past 12 months    BP Readings from Last 3 Encounters:   07/26/17 118/74   03/27/17 118/74   03/16/17 120/80                Passed - Patient is 12 years of age or older       Passed - Lipid panel on file within the past 12 months    Recent Labs   Lab Test  07/19/17   0748   04/07/15   0822   CHOL  234*   < >  217*   TRIG  69   < >  69   HDL  96   < >  86   LDL  124*   < >  117   NHDL  138*   < >   --    VLDL   --    --   14   CHOLHDLRATIO   --    --   2.5    < > = values in this interval not displayed.              Passed - Heart Rate on file within past 12 months    Pulse Readings from Last 3 Encounters:   07/26/17 98   03/27/17 104   03/16/17 95              Passed - A1c or Glucose on file in past 12 months    Recent Labs   Lab Test  07/26/17   1431   GLC  84       Please review patients last 3 weights. If a weight gain of >10 lbs exists, you may refill the prescription once after instructing the patient to schedule an appointment within the next 30 days.    Wt Readings from Last 3 Encounters:   07/26/17 202 lb 8 oz (91.9 kg)   03/27/17 196 lb 8 oz (89.1 kg) " "  03/16/17 200 lb (90.7 kg)            Passed - Patient is not pregnant       Passed - No positve pregnancy test on file in past 12 months        venlafaxine (EFFEXOR-XR) 75 MG 24 hr capsule [Pharmacy Med Name: VENLAFAXINE HCL ER CAPS 75MG] 270 capsule 1     Sig: TAKE 3 CAPSULES DAILY    Serotonin-Norepinephrine Reuptake Inhibitors  Failed    2/19/2018  2:48 PM       Failed - PHQ-9 score of less than 5 in past 6 months    The PHQ-9 criteria is meant to fail. It requires a PHQ-9 score review         Failed - Recent (6 mo) or future visit with authorizing provider's specialty    Patient had office visit in the last 6 months or has a visit in the next 30 days with authorizing provider.  See \"Patient Info\" tab in inbasket, or \"Choose Columns\" in Meds & Orders section of the refill encounter.           Passed - Blood pressure under 140/90 in past 12 months    BP Readings from Last 3 Encounters:   07/26/17 118/74   03/27/17 118/74   03/16/17 120/80                Passed - Patient is age 18 or older       Passed - No active pregnancy on record       Passed - Normal serum creatinine on file in past 12 months    Recent Labs   Lab Test  07/26/17   1431   CR  0.95            Passed - No positive pregnancy test in past 12 months        topiramate (TOPAMAX) 100 MG tablet [Pharmacy Med Name: TOPIRAMATE TABS 100MG] 180 tablet 1     Sig: TAKE 1 TABLET TWICE A DAY    Anticonvulsants Protocol  Failed    2/19/2018  2:48 PM       Failed - Review Authorizing provider's last note.     Refer to last progress notes: confirm request is for original authorizing provider (cannot be through other providers).         Failed - Recent or future visit with authorizing provider    Patient had office visit in the last 6 months or has a visit in the next 30 days with authorizing provider.  See \"Patient Info\" tab in inbasket, or \"Choose Columns\" in Meds & Orders section of the refill encounter.           Passed - Normal serum creatinine on file in " past 6 months    Recent Labs   Lab Test  07/26/17   1431   CR  0.95            Passed - No active pregnancy on record       Passed - No positive pregnancy test in last 12 months        Dania Joseph, RN - [facility]

## 2018-02-20 NOTE — TELEPHONE ENCOUNTER
"Please call/mychart patient  Her abilify-- recommended to monitor blood work with the medication. Can have this done with other labs in July  \"Dr. Rdz is leaving the practice to join hospice full time. Can I help you make an appointment with another provider here at [facility]?\"  Recommend Kristyn Oliva or Gracia Yung  Ask her to schedule visit now  Mary Carmen Rdz MD     "

## 2018-08-20 DIAGNOSIS — F33.1 MAJOR DEPRESSIVE DISORDER, RECURRENT EPISODE, MODERATE (H): ICD-10-CM

## 2018-08-20 DIAGNOSIS — E78.5 HYPERLIPIDEMIA LDL GOAL <130: ICD-10-CM

## 2018-08-20 DIAGNOSIS — G43.709 CHRONIC MIGRAINE WITHOUT AURA WITHOUT STATUS MIGRAINOSUS, NOT INTRACTABLE: ICD-10-CM

## 2018-08-20 DIAGNOSIS — F51.04 PSYCHOPHYSIOLOGIC INSOMNIA: ICD-10-CM

## 2018-08-20 DIAGNOSIS — I10 ESSENTIAL HYPERTENSION WITH GOAL BLOOD PRESSURE LESS THAN 140/90: ICD-10-CM

## 2018-08-20 DIAGNOSIS — E03.8 OTHER SPECIFIED HYPOTHYROIDISM: ICD-10-CM

## 2018-08-20 NOTE — TELEPHONE ENCOUNTER
"Pending Prescriptions:                       Disp   Refills    venlafaxine (EFFEXOR-XR) 75 MG 24 hr caps*270 ca*1          atorvastatin (LIPITOR) 20 MG tablet       90 tab*1            Sig: Take 1 tablet (20 mg) by mouth daily    topiramate (TOPAMAX) 100 MG tablet        180 ta*1            Sig: Take 1 tablet (100 mg) by mouth 2 times daily    amLODIPine (NORVASC) 10 MG tablet         90 tab*1            Sig: Take 1 tablet (10 mg) by mouth daily    lisinopril (PRINIVIL/ZESTRIL) 5 MG tablet 90 tab*1            Sig: Take 1 tablet (5 mg) by mouth daily    ARIPiprazole (ABILIFY) 2 MG tablet        45 tab*1          traZODone (DESYREL) 50 MG tablet          90 tab*1            Sig: Take 1 tablet (50 mg) by mouth At Bedtime    SYNTHROID 137 MCG tablet                  90 tab*1            Sig: Take 1 tablet (137 mcg) by mouth daily    Failed [facility] refill protocol, see below:    Requested Prescriptions   Pending Prescriptions Disp Refills     venlafaxine (EFFEXOR-XR) 75 MG 24 hr capsule 270 capsule 1    Serotonin-Norepinephrine Reuptake Inhibitors  Failed    8/20/2018 12:03 PM       Failed - Blood pressure under 140/90 in past 12 months    BP Readings from Last 3 Encounters:   07/26/17 118/74   03/27/17 118/74   03/16/17 120/80                Failed - PHQ-9 score of less than 5 in past 6 months    Please review last PHQ-9 score.          Failed - Normal serum creatinine on file in past 12 months    Recent Labs   Lab Test  07/26/17   1431   CR  0.95            Failed - Recent (6 mo) or future (30 days) visit within the authorizing provider's specialty    Patient had office visit in the last 6 months or has a visit in the next 30 days with authorizing provider or within the authorizing provider's specialty.  See \"Patient Info\" tab in inbasket, or \"Choose Columns\" in Meds & Orders section of the refill encounter.           Passed - Patient is age 18 or older       Passed - No active pregnancy on record       Passed - No positive " "pregnancy test in past 12 months        atorvastatin (LIPITOR) 20 MG tablet 90 tablet 1     Sig: Take 1 tablet (20 mg) by mouth daily    Statins Protocol Failed    8/20/2018 12:03 PM       Failed - LDL on file in past 12 months    Recent Labs   Lab Test  07/19/17   0748   LDL  124*            Failed - Recent (12 mo) or future (30 days) visit within the authorizing provider's specialty    Patient had office visit in the last 12 months or has a visit in the next 30 days with authorizing provider or within the authorizing provider's specialty.  See \"Patient Info\" tab in inbasket, or \"Choose Columns\" in Meds & Orders section of the refill encounter.           Passed - No abnormal creatine kinase in past 12 months    No lab results found.            Passed - Patient is age 18 or older       Passed - No active pregnancy on record       Passed - No positive pregnancy test in past 12 months        topiramate (TOPAMAX) 100 MG tablet 180 tablet 1     Sig: Take 1 tablet (100 mg) by mouth 2 times daily    Anti-Seizure Meds Protocol  Failed    8/20/2018 12:03 PM       Failed - Recent (12 mo) or future (30 days) visit within the authorizing provider's specialty    Patient had office visit in the last 12 months or has a visit in the next 30 days with authorizing provider or within the authorizing provider's specialty.  See \"Patient Info\" tab in inbasket, or \"Choose Columns\" in Meds & Orders section of the refill encounter.           Failed - Review Authorizing provider's last note.     Refer to last progress notes: confirm request is for original authorizing provider (cannot be through other providers).         Failed - Normal CBC on file in past 26 months    Recent Labs   Lab Test  06/22/15   1530   WBC  9.2   RBC  4.51   HGB  13.9   HCT  42.7   PLT  330       For [facility] ONLY: [id number] = WBC, VHBA801 = RBC         Failed - Normal platelet count on file in past 26 months    Recent Labs   Lab Test  06/22/15   1530   PLT  330           " "   Passed - Normal ALT or AST on file in past 26 months    Recent Labs   Lab Test  07/26/17   1431   ALT  39     Recent Labs   Lab Test  07/26/17   1431   AST  30            Passed - No active pregnancy on record       Passed - No positive pregnancy test in last 12 months        amLODIPine (NORVASC) 10 MG tablet 90 tablet 1     Sig: Take 1 tablet (10 mg) by mouth daily    Calcium Channel Blockers Protocol  Failed    8/20/2018 12:03 PM       Failed - Blood pressure under 140/90 in past 12 months    BP Readings from Last 3 Encounters:   07/26/17 118/74   03/27/17 118/74   03/16/17 120/80                Failed - Recent (12 mo) or future (30 days) visit within the authorizing provider's specialty    Patient had office visit in the last 12 months or has a visit in the next 30 days with authorizing provider or within the authorizing provider's specialty.  See \"Patient Info\" tab in inbasket, or \"Choose Columns\" in Meds & Orders section of the refill encounter.           Failed - Normal serum creatinine on file in past 12 months    Recent Labs   Lab Test  07/26/17   1431   CR  0.95            Passed - Patient is age 18 or older       Passed - No active pregnancy on record       Passed - No positive pregnancy test in past 12 months        lisinopril (PRINIVIL/ZESTRIL) 5 MG tablet 90 tablet 1     Sig: Take 1 tablet (5 mg) by mouth daily    ACE Inhibitors (Including Combos) Protocol Failed    8/20/2018 12:03 PM       Failed - Blood pressure under 140/90 in past 12 months    BP Readings from Last 3 Encounters:   07/26/17 118/74   03/27/17 118/74   03/16/17 120/80                Failed - Recent (12 mo) or future (30 days) visit within the authorizing provider's specialty    Patient had office visit in the last 12 months or has a visit in the next 30 days with authorizing provider or within the authorizing provider's specialty.  See \"Patient Info\" tab in inbasket, or \"Choose Columns\" in Meds & Orders section of the refill " encounter.           Failed - Normal serum creatinine on file in past 12 months    Recent Labs   Lab Test  07/26/17   1431   CR  0.95            Failed - Normal serum potassium on file in past 12 months    Recent Labs   Lab Test  07/26/17   1431   POTASSIUM  3.8            Passed - Patient is age 18 or older       Passed - No active pregnancy on record       Passed - No positive pregnancy test in past 12 months        ARIPiprazole (ABILIFY) 2 MG tablet 45 tablet 1    Antipsychotic Medications Failed    8/20/2018 12:03 PM       Failed - Blood pressure under 140/90 in past 12 months    BP Readings from Last 3 Encounters:   07/26/17 118/74   03/27/17 118/74   03/16/17 120/80                Failed - Lipid panel on file within the past 12 months    Recent Labs   Lab Test  07/19/17   0748   04/07/15   0822   CHOL  234*   < >  217*   TRIG  69   < >  69   HDL  96   < >  86   LDL  124*   < >  117   NHDL  138*   < >   --    VLDL   --    --   14   CHOLHDLRATIO   --    --   2.5    < > = values in this interval not displayed.              Failed - CBC on file in past 12 months    Recent Labs   Lab Test  06/22/15   1530   WBC  9.2   RBC  4.51   HGB  13.9   HCT  42.7   PLT  330       For [facility] ONLY: [id number] = WBC, QXFJ666 = RBC         Failed - Heart Rate on file within past 12 months    Pulse Readings from Last 3 Encounters:   07/26/17 98   03/27/17 104   03/16/17 95              Failed - A1c or Glucose on file in past 12 months    Recent Labs   Lab Test  07/26/17   1431   GLC  84       Please review patients last 3 weights. If a weight gain of >10 lbs exists, you may refill the prescription once after instructing the patient to schedule an appointment within the next 30 days.    Wt Readings from Last 3 Encounters:   07/26/17 91.9 kg (202 lb 8 oz)   03/27/17 89.1 kg (196 lb 8 oz)   03/16/17 90.7 kg (200 lb)            Failed - Recent (6 mo) or future (30 days) visit within the authorizing provider's specialty    Patient had office  "visit in the last 6 months or has a visit in the next 30 days with authorizing provider or within the authorizing provider's specialty.  See \"Patient Info\" tab in inbasket, or \"Choose Columns\" in Meds & Orders section of the refill encounter.           Passed - Patient is 12 years of age or older       Passed - Patient is not pregnant       Passed - No positve pregnancy test on file in past 12 months        traZODone (DESYREL) 50 MG tablet 90 tablet 1     Sig: Take 1 tablet (50 mg) by mouth At Bedtime    Serotonin Modulators Failed    8/20/2018 12:03 PM       Failed - Recent (12 mo) or future (30 days) visit within the authorizing provider's specialty    Patient had office visit in the last 12 months or has a visit in the next 30 days with authorizing provider or within the authorizing provider's specialty.  See \"Patient Info\" tab in inbasket, or \"Choose Columns\" in Meds & Orders section of the refill encounter.           Passed - Patient is age 18 or older       Passed - No active pregnancy on record       Passed - No positive pregnancy test in past 12 months        SYNTHROID 137 MCG tablet 90 tablet 1     Sig: Take 1 tablet (137 mcg) by mouth daily    Thyroid Protocol Failed    8/20/2018 12:03 PM       Failed - Recent (12 mo) or future (30 days) visit within the authorizing provider's specialty    Patient had office visit in the last 12 months or has a visit in the next 30 days with authorizing provider or within the authorizing provider's specialty.  See \"Patient Info\" tab in inbasket, or \"Choose Columns\" in Meds & Orders section of the refill encounter.           Failed - Normal TSH on file in past 12 months    Recent Labs   Lab Test  07/19/17   0748   TSH  0.80             Passed - Patient is 12 years or older       Passed - No active pregnancy on record    If patient is pregnant or has had a positive pregnancy test, please check TSH.         Passed - No positive pregnancy test in past 12 months    If patient " is pregnant or has had a positive pregnancy test, please check TSH.          Lab orders pending. Routing to provider.    Georgina Pacheco RN

## 2018-08-27 NOTE — TELEPHONE ENCOUNTER
She hasn't been seen for over a year and her prior provider is now gone.  We can refill for 1 month only but needs to establish with a new PCP

## 2018-08-27 NOTE — TELEPHONE ENCOUNTER
Call to schedule annual exam and establish new provider. Send back to refill pool when complete.  Sheila Gonzalez RN

## 2018-08-29 NOTE — TELEPHONE ENCOUNTER
Patient returning call patient states she is in a hard time right now as she is going through a divorce and relocating to [place] in October, patient is wondering if we are able to refill another 90 day supply so she has time to get moved in, find a PCP out there and get established and get all her records transferred. Patient states she has been on these medications for a long time and cant go with out them.   Please advise.   Thank you  Susy

## 2018-08-29 NOTE — TELEPHONE ENCOUNTER
Ok for 1 month supply.  Either needs seen here or with her new doctor in [place] for any further.

## 2018-08-29 NOTE — TELEPHONE ENCOUNTER
Attempt 2, called patient and left VM to call clinic to set up annual exam and establish care with provider. Okay to schedule if patient calls back.  Mary Carmen BURGESS CMA ([facility])

## 2018-08-30 RX ORDER — VENLAFAXINE HYDROCHLORIDE 75 MG/1
CAPSULE, EXTENDED RELEASE ORAL
Qty: 270 CAPSULE | Refills: 0 | Status: SHIPPED | OUTPATIENT
Start: 2018-08-30 | End: 2018-12-14

## 2018-08-30 RX ORDER — TRAZODONE HYDROCHLORIDE 50 MG/1
50 TABLET, FILM COATED ORAL AT BEDTIME
Qty: 90 TABLET | Refills: 0 | Status: SHIPPED | OUTPATIENT
Start: 2018-08-30 | End: 2018-12-14

## 2018-08-30 RX ORDER — ARIPIPRAZOLE 2 MG/1
TABLET ORAL
Qty: 45 TABLET | Refills: 0 | Status: SHIPPED | OUTPATIENT
Start: 2018-08-30 | End: 2018-12-14

## 2018-08-30 RX ORDER — AMLODIPINE BESYLATE 10 MG/1
10 TABLET ORAL DAILY
Qty: 90 TABLET | Refills: 0 | Status: SHIPPED | OUTPATIENT
Start: 2018-08-30 | End: 2018-12-14

## 2018-08-30 RX ORDER — LISINOPRIL 5 MG/1
5 TABLET ORAL DAILY
Qty: 90 TABLET | Refills: 0 | Status: SHIPPED | OUTPATIENT
Start: 2018-08-30 | End: 2018-12-14

## 2018-08-30 RX ORDER — ATORVASTATIN CALCIUM 20 MG/1
20 TABLET, FILM COATED ORAL DAILY
Qty: 90 TABLET | Refills: 0 | Status: SHIPPED | OUTPATIENT
Start: 2018-08-30 | End: 2018-12-14

## 2018-08-30 RX ORDER — LEVOTHYROXINE SODIUM 137 MCG
137 TABLET ORAL DAILY
Qty: 90 TABLET | Refills: 0 | Status: SHIPPED | OUTPATIENT
Start: 2018-08-30 | End: 2018-12-14

## 2018-08-30 RX ORDER — TOPIRAMATE 100 MG/1
100 TABLET, FILM COATED ORAL 2 TIMES DAILY
Qty: 180 TABLET | Refills: 0 | Status: SHIPPED | OUTPATIENT
Start: 2018-08-30 | End: 2018-12-14

## 2018-08-30 NOTE — TELEPHONE ENCOUNTER
Patient notified of providers message as written.  Patient states she does not close on her home until 10/15/18 and after that she will see a doctor in [place].  Patient requesting at least a 2 month supply to get her through until then.   Please advise   Donna Garza RN

## 2018-08-30 NOTE — TELEPHONE ENCOUNTER
It would be safest to have fasting labs done before then. I at least recommend a lab only appointment if she is not able to follow-up in clinic. If she is having depression symptoms, she could do an e-visit. I did sent 90 day supply of medications.   Tessa Odonnell MD

## 2018-08-30 NOTE — TELEPHONE ENCOUNTER
Patient updated  She declined an OV  She scheduled a fasting lab appointment for 9/6/18  RN stressed the importance of labwork to monitor meds and make sure current doses are effective and safe to continue.    Patient verbalized understanding.  PHQ9 completed. Score above goal but has improved since the last couple of checks  She declined a dose adjustment, stating that her current meds are working    PHQ-9 SCORE 7/27/2017 1/10/2018 8/30/2018   Total Score - - -   Total Score 10 20 6     Colin Whitley, RN

## 2018-08-31 ASSESSMENT — PATIENT HEALTH QUESTIONNAIRE - PHQ9: SUM OF ALL RESPONSES TO PHQ QUESTIONS 1-9: 6

## 2018-09-19 ENCOUNTER — FCC EXTENDED DOCUMENTATION (OUTPATIENT)
Dept: PSYCHOLOGY | Facility: CLINIC | Age: 63
End: 2018-09-19

## 2018-09-19 NOTE — PROGRESS NOTES
Discharge Summary  Multiple Sessions    Client Name: Mary Grace Tompkins MRN#: [id number] YOB: 1955      Intake / Discharge Date: 3-1-16 and 3-8-16      DSM5 Diagnoses: (Sustained by DSM5 Criteria Listed Above)        Diagnoses: 296.32 Major Depressive Disorder, Recurrent Episode, Moderate _ and With melancholic features  Psychosocial & Contextual Factors: Laid off of job after 35 years/ traumatic childhood   WHODAS 2.0 (12 item)  This questionnaire asks about difficulties due to health conditions. Health conditions  include disease or illnesses, other health problems that may be short or long lasting,  injuries, mental health or emotional problems, and problems with alcohol or drugs.  Think back over the past 30 days and answer these questions, thinking about how much  difficulty you had doing the following activities. For each question, please [place] only  one response.          S1  Standing for long periods such as 30 minutes?  Moderate = 3     S2  Taking care of household responsibilities?  Moderate = 3     S3  Learning a new task, for example, learning how to get to a new place?  None = 1     S4  How much of a problem do you have joining community activities (for example, festivals, Confucianism or other activities) in the same way as anyone else can?  Severe = 4     S5  How much have you been emotionally affected by your health problems?  Severe = 4     In the past 30 days, how much difficulty did you have in:    S6  Concentrating on doing something for ten minutes?  Mild = 2    S7  Walking a long distance such as a kilometer (or equivalent)?  Mild = 2    S8  Washing your whole body?  Mild = 2    S9  Getting dressed?  Mild = 2    S10  Dealing with people you do not know?  Mild = 2    S11  Maintaining a friendship?  Mild = 2    S12  Your day to day work?  Mild = 2       H1  Overall, in the past 30 days, how many days were these difficulties present?  Record number of days 30    H2  In  the past 30 days, for how many days were you totally unable to carry out your usual activities or work because of any health condition?  Record number of days 0    H3  In the past 30 days, not counting the days that you were totally unable, for how many days did you cut back or reduce your usual activities or work because of any health condition?  Record number of days 10         Presenting Concern:  Depression      Reason for Discharge:  Client did not return      Disposition at Time of Last Encounter:   Comments:   Client was doing better at her second visit and was proactive on her goals      Risk Management:   Client has had a history of suicidal ideation: Suicidal ideation with no plan or intent in the past - none during episode of care   A safety and risk management plan has not been developed at this time, however client was given the after-hours number / 911 should there be a change in any of these risk factors.      Referred To:  Does not apply         Carlota Brandon,    9/19/2018

## 2018-11-28 DIAGNOSIS — F33.1 MAJOR DEPRESSIVE DISORDER, RECURRENT EPISODE, MODERATE (H): ICD-10-CM

## 2018-11-28 DIAGNOSIS — I10 ESSENTIAL HYPERTENSION WITH GOAL BLOOD PRESSURE LESS THAN 140/90: ICD-10-CM

## 2018-11-28 DIAGNOSIS — E78.5 HYPERLIPIDEMIA LDL GOAL <130: ICD-10-CM

## 2018-11-28 DIAGNOSIS — F51.04 PSYCHOPHYSIOLOGIC INSOMNIA: ICD-10-CM

## 2018-11-28 DIAGNOSIS — G43.709 CHRONIC MIGRAINE WITHOUT AURA WITHOUT STATUS MIGRAINOSUS, NOT INTRACTABLE: ICD-10-CM

## 2018-11-28 DIAGNOSIS — E03.8 OTHER SPECIFIED HYPOTHYROIDISM: ICD-10-CM

## 2018-11-28 RX ORDER — VENLAFAXINE HYDROCHLORIDE 75 MG/1
CAPSULE, EXTENDED RELEASE ORAL
Start: 2018-11-28

## 2018-11-28 RX ORDER — LISINOPRIL 5 MG/1
TABLET ORAL
Start: 2018-11-28

## 2018-11-28 RX ORDER — TRAZODONE HYDROCHLORIDE 50 MG/1
TABLET, FILM COATED ORAL
Start: 2018-11-28

## 2018-11-28 RX ORDER — LEVOTHYROXINE SODIUM 137 MCG
TABLET ORAL
Start: 2018-11-28

## 2018-11-28 RX ORDER — ARIPIPRAZOLE 2 MG/1
TABLET ORAL
Start: 2018-11-28

## 2018-11-28 RX ORDER — ATORVASTATIN CALCIUM 20 MG/1
TABLET, FILM COATED ORAL
Start: 2018-11-28

## 2018-11-28 RX ORDER — AMLODIPINE BESYLATE 10 MG/1
TABLET ORAL
Start: 2018-11-28

## 2018-11-28 RX ORDER — TOPIRAMATE 100 MG/1
TABLET, FILM COATED ORAL
Start: 2018-11-28

## 2018-11-28 NOTE — TELEPHONE ENCOUNTER
Refused Prescriptions:                       Disp   Refills    SYNTHROID 137 MCG tablet [Pharmacy Med Nam*                Sig: TAKE 1 TABLET DAILY  Refused By: JALEN GILL  Reason for Refusal: Patient no longer under provider care    traZODone (DESYREL) 50 MG tablet [Pharmacy*                Sig: TAKE 1 TABLET AT BEDTIME  Refused By: JALEN GILL  Reason for Refusal: Patient no longer under provider care    ARIPiprazole (ABILIFY) 2 MG tablet [Pharma*                Sig: TAKE ONE-HALF (1/2) TABLET AT BEDTIME  Refused By: JALEN GILL  Reason for Refusal: Patient no longer under provider care    lisinopril (PRINIVIL/ZESTRIL) 5 MG tablet *                Sig: TAKE 1 TABLET DAILY  Refused By: JALEN GILL  Reason for Refusal: Patient no longer under provider care    atorvastatin (LIPITOR) 20 MG tablet [Pharm*                Sig: TAKE 1 TABLET DAILY  Refused By: JALEN GILL  Reason for Refusal: Patient no longer under provider care    amLODIPine (NORVASC) 10 MG tablet [Pharmac*                Sig: TAKE 1 TABLET DAILY  Refused By: JALEN GILL  Reason for Refusal: Patient no longer under provider care    venlafaxine (EFFEXOR-XR) 75 MG 24 hr capsu*                Sig: TAKE 3 CAPSULES DAILY  Refused By: JALEN GILL  Reason for Refusal: Patient no longer under provider care    topiramate (TOPAMAX) 100 MG tablet [Pharma*                Sig: TAKE 1 TABLET TWICE A DAY  Refused By: JALEN GILL  Reason for Refusal: Patient no longer under provider care

## 2018-12-03 ENCOUNTER — TELEPHONE (OUTPATIENT)
Dept: FAMILY MEDICINE | Facility: CLINIC | Age: 63
End: 2018-12-03

## 2018-12-03 NOTE — TELEPHONE ENCOUNTER
Called patient and left detailed VM informing patient of message. .  Mary Carmen BURGESS CMA ([facility])

## 2018-12-03 NOTE — TELEPHONE ENCOUNTER
Please see refill encounter dated 11/28/18. Refills were denied by Dr. Odonnell.     Please assist pt with scheduling an appt. Thanks.    Mary Carmen Whyte RN  [facility]

## 2018-12-03 NOTE — TELEPHONE ENCOUNTER
Reason for Call:  Other prescription    Detailed comments: Patient needs one more refill of medications for 90 days- she has not been able to establish new care. Please return her call.    Phone Number Patient can be reached at: Home number on file [phone] (home)    Best Time: ASAP    Can we leave a detailed message on this number? YES    Call taken on 12/3/2018 at 9:09 AM by Monica Pinon

## 2018-12-03 NOTE — TELEPHONE ENCOUNTER
Called patient she is in the process in establishing care in her new home in [place] which will not be until January 30 th I did explain to patient that it has been a year and a half since seen and that this may still be denied.  Stacie Nunez,

## 2018-12-04 ENCOUNTER — TELEPHONE (OUTPATIENT)
Dept: FAMILY MEDICINE | Facility: CLINIC | Age: 63
End: 2018-12-04

## 2018-12-04 NOTE — TELEPHONE ENCOUNTER
Called patient back gave her the [employer] Help number which is [phone] she will call and have them help her.  Stacie Nunez,

## 2018-12-04 NOTE — TELEPHONE ENCOUNTER
Reason for Call:  Other call back    Detailed comments: Patient is calling because she wants to schedule an evisit but said she cannot get on mychart. Please call back    Phone Number Patient can be reached at: Home number on file [phone] (home)    Best Time: any    Can we leave a detailed message on this number? YES    Call taken on 12/4/2018 at 9:43 AM by Meredith Hensley

## 2018-12-13 ENCOUNTER — VIRTUAL VISIT (OUTPATIENT)
Dept: FAMILY MEDICINE | Facility: CLINIC | Age: 63
End: 2018-12-13
Payer: COMMERCIAL

## 2018-12-13 ENCOUNTER — TELEPHONE (OUTPATIENT)
Dept: FAMILY MEDICINE | Facility: CLINIC | Age: 63
End: 2018-12-13

## 2018-12-13 DIAGNOSIS — Z53.9 ERRONEOUS ENCOUNTER--DISREGARD: Primary | ICD-10-CM

## 2018-12-13 NOTE — TELEPHONE ENCOUNTER
Reason for call: med request and FYI  Patient called regarding (reason for call): prescription-all with [facility]    Additional comments: joan will not let her schedule a e visit. Please call to discuss further    Phone number to reach patient:  [phone]  Best Time:  anytime    Can we leave a detailed message on this number?  YES

## 2018-12-13 NOTE — TELEPHONE ENCOUNTER
Left message on answering machine for patient to call back to the RN hotline at [phone].    I added Dr. Odonnell as PCP and tried starting an e visit with pt. Not sure if that will help or not. If not, should contact [employer] support.    Mary Carmen Whyte RN  [facility]

## 2018-12-14 ENCOUNTER — E-VISIT (OUTPATIENT)
Dept: FAMILY MEDICINE | Facility: CLINIC | Age: 63
End: 2018-12-14
Payer: COMMERCIAL

## 2018-12-14 DIAGNOSIS — E03.8 OTHER SPECIFIED HYPOTHYROIDISM: ICD-10-CM

## 2018-12-14 DIAGNOSIS — E78.5 HYPERLIPIDEMIA LDL GOAL <130: ICD-10-CM

## 2018-12-14 DIAGNOSIS — G43.709 CHRONIC MIGRAINE WITHOUT AURA WITHOUT STATUS MIGRAINOSUS, NOT INTRACTABLE: ICD-10-CM

## 2018-12-14 DIAGNOSIS — F33.1 MAJOR DEPRESSIVE DISORDER, RECURRENT EPISODE, MODERATE (H): Primary | ICD-10-CM

## 2018-12-14 DIAGNOSIS — I10 ESSENTIAL HYPERTENSION WITH GOAL BLOOD PRESSURE LESS THAN 140/90: ICD-10-CM

## 2018-12-14 DIAGNOSIS — Z12.11 SPECIAL SCREENING FOR MALIGNANT NEOPLASMS, COLON: ICD-10-CM

## 2018-12-14 PROCEDURE — 99444 ZZC PHYSICIAN ONLINE EVALUATION & MANAGEMENT SERVICE: CPT | Performed by: FAMILY MEDICINE

## 2018-12-14 RX ORDER — LEVOTHYROXINE SODIUM 137 MCG
137 TABLET ORAL DAILY
Qty: 90 TABLET | Refills: 0 | Status: SHIPPED | OUTPATIENT
Start: 2018-12-14

## 2018-12-14 RX ORDER — TOPIRAMATE 100 MG/1
100 TABLET, FILM COATED ORAL 2 TIMES DAILY
Qty: 180 TABLET | Refills: 0 | Status: SHIPPED | OUTPATIENT
Start: 2018-12-14

## 2018-12-14 RX ORDER — LISINOPRIL 5 MG/1
5 TABLET ORAL DAILY
Qty: 90 TABLET | Refills: 0 | Status: SHIPPED | OUTPATIENT
Start: 2018-12-14 | End: 2019-01-07

## 2018-12-14 RX ORDER — TRAZODONE HYDROCHLORIDE 50 MG/1
50 TABLET, FILM COATED ORAL AT BEDTIME
Qty: 90 TABLET | Refills: 0 | Status: SHIPPED | OUTPATIENT
Start: 2018-12-14

## 2018-12-14 RX ORDER — AMLODIPINE BESYLATE 10 MG/1
10 TABLET ORAL DAILY
Qty: 90 TABLET | Refills: 0 | Status: SHIPPED | OUTPATIENT
Start: 2018-12-14

## 2018-12-14 RX ORDER — ATORVASTATIN CALCIUM 20 MG/1
20 TABLET, FILM COATED ORAL DAILY
Qty: 90 TABLET | Refills: 0 | Status: SHIPPED | OUTPATIENT
Start: 2018-12-14

## 2018-12-14 RX ORDER — VENLAFAXINE HYDROCHLORIDE 75 MG/1
CAPSULE, EXTENDED RELEASE ORAL
Qty: 270 CAPSULE | Refills: 0 | Status: SHIPPED | OUTPATIENT
Start: 2018-12-14

## 2018-12-14 RX ORDER — ARIPIPRAZOLE 2 MG/1
TABLET ORAL
Qty: 45 TABLET | Refills: 0 | Status: SHIPPED | OUTPATIENT
Start: 2018-12-14 | End: 2019-01-07

## 2018-12-14 NOTE — TELEPHONE ENCOUNTER
Pt was able to do an evisit through Dr. Odonnell on mychart. Closing encounter. No further action needed.    Mary Carmen Whyte RN  [facility]

## 2019-01-07 DIAGNOSIS — F33.1 MAJOR DEPRESSIVE DISORDER, RECURRENT EPISODE, MODERATE (H): ICD-10-CM

## 2019-01-07 DIAGNOSIS — K21.9 GASTROESOPHAGEAL REFLUX DISEASE WITHOUT ESOPHAGITIS: ICD-10-CM

## 2019-01-07 DIAGNOSIS — I10 ESSENTIAL HYPERTENSION WITH GOAL BLOOD PRESSURE LESS THAN 140/90: ICD-10-CM

## 2019-01-07 RX ORDER — LISINOPRIL 5 MG/1
5 TABLET ORAL DAILY
Qty: 90 TABLET | Refills: 0 | Status: SHIPPED | OUTPATIENT
Start: 2019-01-07

## 2019-01-07 RX ORDER — ARIPIPRAZOLE 2 MG/1
TABLET ORAL
Qty: 45 TABLET | Refills: 0 | Status: SHIPPED | OUTPATIENT
Start: 2019-01-07

## 2019-01-07 RX ORDER — LANSOPRAZOLE 30 MG/1
30 CAPSULE, DELAYED RELEASE ORAL DAILY
Qty: 90 CAPSULE | Refills: 1 | Status: SHIPPED | OUTPATIENT
Start: 2019-01-07

## 2019-01-07 NOTE — TELEPHONE ENCOUNTER
Reason for call:  Medication   If this is a refill request, has the caller requested the refill from the pharmacy already? Yes  Will the patient be using a [facility]? No  Name of the pharmacy and phone number for the current request: [facility]  [place], [place] - [facility] [facility]    Name of the medication requested: ARIPiprazole (ABILIFY) 2 MG tablet  lisinopril (PRINIVIL/ZESTRIL) 5 MG tablet  LANsoprazole (PREVACID) 30 MG CR capsule    Other request: Patient moved and [facility] did not get the new address - one package was lost in the mail. Patient did not receive these medications. Please contact patient with next steps.    Phone number to reach patient:  Cell number on file:    Telephone Information:   Mobile [phone]       Best Time:  ASAP    Can we leave a detailed message on this number?  YES

## 2019-11-05 ENCOUNTER — HEALTH MAINTENANCE LETTER (OUTPATIENT)
Age: 64
End: 2019-11-05

## 2019-12-19 ENCOUNTER — MYC MEDICAL ADVICE (OUTPATIENT)
Dept: FAMILY MEDICINE | Facility: CLINIC | Age: 64
End: 2019-12-19

## 2020-11-22 ENCOUNTER — HEALTH MAINTENANCE LETTER (OUTPATIENT)
Age: 65
End: 2020-11-22

## 2021-09-19 ENCOUNTER — HEALTH MAINTENANCE LETTER (OUTPATIENT)
Age: 66
End: 2021-09-19

## 2021-11-14 ENCOUNTER — HEALTH MAINTENANCE LETTER (OUTPATIENT)
Age: 66
End: 2021-11-14

## 2022-01-09 ENCOUNTER — HEALTH MAINTENANCE LETTER (OUTPATIENT)
Age: 67
End: 2022-01-09

## 2022-11-20 ENCOUNTER — HEALTH MAINTENANCE LETTER (OUTPATIENT)
Age: 67
End: 2022-11-20

## 2023-04-15 ENCOUNTER — HEALTH MAINTENANCE LETTER (OUTPATIENT)
Age: 68
End: 2023-04-15

## 2023-11-25 ENCOUNTER — HEALTH MAINTENANCE LETTER (OUTPATIENT)
Age: 68
End: 2023-11-25